# Patient Record
Sex: FEMALE | Race: WHITE
[De-identification: names, ages, dates, MRNs, and addresses within clinical notes are randomized per-mention and may not be internally consistent; named-entity substitution may affect disease eponyms.]

---

## 2017-04-29 ENCOUNTER — HOSPITAL ENCOUNTER (INPATIENT)
Dept: HOSPITAL 8 - ED | Age: 57
LOS: 3 days | Discharge: HOME | DRG: 417 | End: 2017-05-02
Attending: HOSPITALIST | Admitting: HOSPITALIST
Payer: COMMERCIAL

## 2017-04-29 VITALS — SYSTOLIC BLOOD PRESSURE: 122 MMHG | DIASTOLIC BLOOD PRESSURE: 75 MMHG

## 2017-04-29 VITALS — WEIGHT: 201.06 LBS | HEIGHT: 70 IN | BODY MASS INDEX: 28.78 KG/M2

## 2017-04-29 VITALS — SYSTOLIC BLOOD PRESSURE: 103 MMHG | DIASTOLIC BLOOD PRESSURE: 61 MMHG

## 2017-04-29 VITALS — DIASTOLIC BLOOD PRESSURE: 75 MMHG | SYSTOLIC BLOOD PRESSURE: 122 MMHG

## 2017-04-29 DIAGNOSIS — R73.9: ICD-10-CM

## 2017-04-29 DIAGNOSIS — K80.00: Primary | ICD-10-CM

## 2017-04-29 DIAGNOSIS — I95.9: ICD-10-CM

## 2017-04-29 DIAGNOSIS — N17.0: ICD-10-CM

## 2017-04-29 DIAGNOSIS — K72.00: ICD-10-CM

## 2017-04-29 DIAGNOSIS — R00.1: ICD-10-CM

## 2017-04-29 DIAGNOSIS — B17.9: ICD-10-CM

## 2017-04-29 LAB
AST SERPL-CCNC: 1709 U/L (ref 15–37)
BUN SERPL-MCNC: 20 MG/DL (ref 7–18)

## 2017-04-29 PROCEDURE — 86706 HEP B SURFACE ANTIBODY: CPT

## 2017-04-29 PROCEDURE — S0074 INJECTION, CEFOTETAN DISODIU: HCPCS

## 2017-04-29 PROCEDURE — 93306 TTE W/DOPPLER COMPLETE: CPT

## 2017-04-29 PROCEDURE — 85730 THROMBOPLASTIN TIME PARTIAL: CPT

## 2017-04-29 PROCEDURE — 36415 COLL VENOUS BLD VENIPUNCTURE: CPT

## 2017-04-29 PROCEDURE — 93005 ELECTROCARDIOGRAM TRACING: CPT

## 2017-04-29 PROCEDURE — 85651 RBC SED RATE NONAUTOMATED: CPT

## 2017-04-29 PROCEDURE — 74181 MRI ABDOMEN W/O CONTRAST: CPT

## 2017-04-29 PROCEDURE — 85025 COMPLETE CBC W/AUTO DIFF WBC: CPT

## 2017-04-29 PROCEDURE — 85610 PROTHROMBIN TIME: CPT

## 2017-04-29 PROCEDURE — 96361 HYDRATE IV INFUSION ADD-ON: CPT

## 2017-04-29 PROCEDURE — 80053 COMPREHEN METABOLIC PANEL: CPT

## 2017-04-29 PROCEDURE — 88304 TISSUE EXAM BY PATHOLOGIST: CPT

## 2017-04-29 PROCEDURE — 86704 HEP B CORE ANTIBODY TOTAL: CPT

## 2017-04-29 PROCEDURE — 84443 ASSAY THYROID STIM HORMONE: CPT

## 2017-04-29 PROCEDURE — 83690 ASSAY OF LIPASE: CPT

## 2017-04-29 PROCEDURE — 76700 US EXAM ABDOM COMPLETE: CPT

## 2017-04-29 PROCEDURE — 86708 HEPATITIS A ANTIBODY: CPT

## 2017-04-29 PROCEDURE — 96375 TX/PRO/DX INJ NEW DRUG ADDON: CPT

## 2017-04-29 PROCEDURE — S0028 INJECTION, FAMOTIDINE, 20 MG: HCPCS

## 2017-04-29 PROCEDURE — 87340 HEPATITIS B SURFACE AG IA: CPT

## 2017-04-29 PROCEDURE — 86803 HEPATITIS C AB TEST: CPT

## 2017-04-29 PROCEDURE — 96374 THER/PROPH/DIAG INJ IV PUSH: CPT

## 2017-04-29 RX ADMIN — OXYCODONE HYDROCHLORIDE AND ACETAMINOPHEN PRN TAB: 5; 325 TABLET ORAL at 17:25

## 2017-04-29 RX ADMIN — SODIUM CHLORIDE AND POTASSIUM CHLORIDE SCH MLS/HR: .9; .15 SOLUTION INTRAVENOUS at 15:26

## 2017-04-29 RX ADMIN — OXYCODONE HYDROCHLORIDE AND ACETAMINOPHEN PRN TAB: 5; 325 TABLET ORAL at 21:34

## 2017-04-29 RX ADMIN — SODIUM CHLORIDE AND POTASSIUM CHLORIDE SCH MLS/HR: .9; .15 SOLUTION INTRAVENOUS at 23:55

## 2017-04-30 VITALS — SYSTOLIC BLOOD PRESSURE: 90 MMHG | DIASTOLIC BLOOD PRESSURE: 52 MMHG

## 2017-04-30 VITALS — DIASTOLIC BLOOD PRESSURE: 56 MMHG | SYSTOLIC BLOOD PRESSURE: 102 MMHG

## 2017-04-30 VITALS — SYSTOLIC BLOOD PRESSURE: 97 MMHG | DIASTOLIC BLOOD PRESSURE: 63 MMHG

## 2017-04-30 VITALS — SYSTOLIC BLOOD PRESSURE: 96 MMHG | DIASTOLIC BLOOD PRESSURE: 61 MMHG

## 2017-04-30 VITALS — SYSTOLIC BLOOD PRESSURE: 106 MMHG | DIASTOLIC BLOOD PRESSURE: 58 MMHG

## 2017-04-30 VITALS — DIASTOLIC BLOOD PRESSURE: 53 MMHG | SYSTOLIC BLOOD PRESSURE: 94 MMHG

## 2017-04-30 VITALS — SYSTOLIC BLOOD PRESSURE: 94 MMHG | DIASTOLIC BLOOD PRESSURE: 50 MMHG

## 2017-04-30 LAB
AST SERPL-CCNC: 799 U/L (ref 15–37)
BUN SERPL-MCNC: 11 MG/DL (ref 7–18)

## 2017-04-30 PROCEDURE — 0FT44ZZ RESECTION OF GALLBLADDER, PERCUTANEOUS ENDOSCOPIC APPROACH: ICD-10-PCS | Performed by: SURGERY

## 2017-04-30 RX ADMIN — OXYCODONE HYDROCHLORIDE AND ACETAMINOPHEN PRN TAB: 5; 325 TABLET ORAL at 20:53

## 2017-04-30 RX ADMIN — SODIUM CHLORIDE AND POTASSIUM CHLORIDE SCH MLS/HR: .9; .15 SOLUTION INTRAVENOUS at 10:26

## 2017-04-30 RX ADMIN — SODIUM CHLORIDE AND POTASSIUM CHLORIDE SCH MLS/HR: .9; .15 SOLUTION INTRAVENOUS at 20:52

## 2017-05-01 VITALS — DIASTOLIC BLOOD PRESSURE: 61 MMHG | SYSTOLIC BLOOD PRESSURE: 110 MMHG

## 2017-05-01 VITALS — DIASTOLIC BLOOD PRESSURE: 55 MMHG | SYSTOLIC BLOOD PRESSURE: 93 MMHG

## 2017-05-01 VITALS — SYSTOLIC BLOOD PRESSURE: 101 MMHG | DIASTOLIC BLOOD PRESSURE: 64 MMHG

## 2017-05-01 VITALS — SYSTOLIC BLOOD PRESSURE: 102 MMHG | DIASTOLIC BLOOD PRESSURE: 65 MMHG

## 2017-05-01 VITALS — DIASTOLIC BLOOD PRESSURE: 50 MMHG | SYSTOLIC BLOOD PRESSURE: 86 MMHG

## 2017-05-01 VITALS — SYSTOLIC BLOOD PRESSURE: 84 MMHG | DIASTOLIC BLOOD PRESSURE: 48 MMHG

## 2017-05-01 LAB
AST SERPL-CCNC: 275 U/L (ref 15–37)
BUN SERPL-MCNC: 8 MG/DL (ref 7–18)

## 2017-05-01 RX ADMIN — OXYCODONE HYDROCHLORIDE AND ACETAMINOPHEN PRN TAB: 5; 325 TABLET ORAL at 05:08

## 2017-05-01 RX ADMIN — OXYCODONE HYDROCHLORIDE AND ACETAMINOPHEN PRN TAB: 5; 325 TABLET ORAL at 13:23

## 2017-05-01 RX ADMIN — OXYCODONE HYDROCHLORIDE AND ACETAMINOPHEN PRN TAB: 5; 325 TABLET ORAL at 01:20

## 2017-05-01 RX ADMIN — SODIUM CHLORIDE AND POTASSIUM CHLORIDE SCH MLS/HR: .9; .15 SOLUTION INTRAVENOUS at 21:46

## 2017-05-01 RX ADMIN — SODIUM CHLORIDE AND POTASSIUM CHLORIDE SCH MLS/HR: .9; .15 SOLUTION INTRAVENOUS at 13:43

## 2017-05-01 RX ADMIN — SODIUM CHLORIDE AND POTASSIUM CHLORIDE SCH MLS/HR: .9; .15 SOLUTION INTRAVENOUS at 05:40

## 2017-05-01 RX ADMIN — OXYCODONE HYDROCHLORIDE AND ACETAMINOPHEN PRN TAB: 5; 325 TABLET ORAL at 09:13

## 2017-05-01 RX ADMIN — OXYCODONE HYDROCHLORIDE AND ACETAMINOPHEN PRN TAB: 5; 325 TABLET ORAL at 20:47

## 2017-05-02 VITALS — DIASTOLIC BLOOD PRESSURE: 63 MMHG | SYSTOLIC BLOOD PRESSURE: 105 MMHG

## 2017-05-02 VITALS — SYSTOLIC BLOOD PRESSURE: 103 MMHG | DIASTOLIC BLOOD PRESSURE: 65 MMHG

## 2017-05-02 RX ADMIN — OXYCODONE HYDROCHLORIDE AND ACETAMINOPHEN PRN TAB: 5; 325 TABLET ORAL at 06:09

## 2022-12-25 ENCOUNTER — HOSPITAL ENCOUNTER (EMERGENCY)
Facility: MEDICAL CENTER | Age: 62
End: 2022-12-26
Attending: STUDENT IN AN ORGANIZED HEALTH CARE EDUCATION/TRAINING PROGRAM
Payer: COMMERCIAL

## 2022-12-25 ENCOUNTER — APPOINTMENT (OUTPATIENT)
Dept: RADIOLOGY | Facility: MEDICAL CENTER | Age: 62
End: 2022-12-25
Attending: STUDENT IN AN ORGANIZED HEALTH CARE EDUCATION/TRAINING PROGRAM

## 2022-12-25 VITALS
SYSTOLIC BLOOD PRESSURE: 142 MMHG | OXYGEN SATURATION: 97 % | TEMPERATURE: 97.6 F | BODY MASS INDEX: 29.4 KG/M2 | HEART RATE: 79 BPM | RESPIRATION RATE: 15 BRPM | WEIGHT: 210 LBS | DIASTOLIC BLOOD PRESSURE: 82 MMHG | HEIGHT: 71 IN

## 2022-12-25 DIAGNOSIS — S09.90XA CLOSED HEAD INJURY, INITIAL ENCOUNTER: ICD-10-CM

## 2022-12-25 DIAGNOSIS — S01.81XA FACIAL LACERATION, INITIAL ENCOUNTER: ICD-10-CM

## 2022-12-25 PROCEDURE — A9270 NON-COVERED ITEM OR SERVICE: HCPCS | Performed by: STUDENT IN AN ORGANIZED HEALTH CARE EDUCATION/TRAINING PROGRAM

## 2022-12-25 PROCEDURE — 70450 CT HEAD/BRAIN W/O DYE: CPT

## 2022-12-25 PROCEDURE — 700102 HCHG RX REV CODE 250 W/ 637 OVERRIDE(OP): Performed by: STUDENT IN AN ORGANIZED HEALTH CARE EDUCATION/TRAINING PROGRAM

## 2022-12-25 PROCEDURE — 90715 TDAP VACCINE 7 YRS/> IM: CPT | Performed by: STUDENT IN AN ORGANIZED HEALTH CARE EDUCATION/TRAINING PROGRAM

## 2022-12-25 PROCEDURE — 90471 IMMUNIZATION ADMIN: CPT

## 2022-12-25 PROCEDURE — 304999 HCHG REPAIR-SIMPLE/INTERMED LEVEL 1

## 2022-12-25 PROCEDURE — 700101 HCHG RX REV CODE 250: Performed by: STUDENT IN AN ORGANIZED HEALTH CARE EDUCATION/TRAINING PROGRAM

## 2022-12-25 PROCEDURE — 700105 HCHG RX REV CODE 258: Performed by: STUDENT IN AN ORGANIZED HEALTH CARE EDUCATION/TRAINING PROGRAM

## 2022-12-25 PROCEDURE — 99284 EMERGENCY DEPT VISIT MOD MDM: CPT

## 2022-12-25 PROCEDURE — 70486 CT MAXILLOFACIAL W/O DYE: CPT

## 2022-12-25 PROCEDURE — 700111 HCHG RX REV CODE 636 W/ 250 OVERRIDE (IP): Performed by: STUDENT IN AN ORGANIZED HEALTH CARE EDUCATION/TRAINING PROGRAM

## 2022-12-25 PROCEDURE — 303747 HCHG EXTRA SUTURE

## 2022-12-25 RX ORDER — SODIUM CHLORIDE 9 MG/ML
1000 INJECTION, SOLUTION INTRAVENOUS ONCE
Status: COMPLETED | OUTPATIENT
Start: 2022-12-25 | End: 2022-12-25

## 2022-12-25 RX ORDER — ACETAMINOPHEN 500 MG
1000 TABLET ORAL ONCE
Status: COMPLETED | OUTPATIENT
Start: 2022-12-25 | End: 2022-12-25

## 2022-12-25 RX ADMIN — ACETAMINOPHEN 1000 MG: 500 TABLET ORAL at 22:40

## 2022-12-25 RX ADMIN — LIDOCAINE HYDROCHLORIDE 10 ML: 10 INJECTION, SOLUTION INFILTRATION; PERINEURAL at 22:05

## 2022-12-25 RX ADMIN — SODIUM CHLORIDE 1000 ML: 9 INJECTION, SOLUTION INTRAVENOUS at 21:40

## 2022-12-25 RX ADMIN — CLOSTRIDIUM TETANI TOXOID ANTIGEN (FORMALDEHYDE INACTIVATED), CORYNEBACTERIUM DIPHTHERIAE TOXOID ANTIGEN (FORMALDEHYDE INACTIVATED), BORDETELLA PERTUSSIS TOXOID ANTIGEN (GLUTARALDEHYDE INACTIVATED), BORDETELLA PERTUSSIS FILAMENTOUS HEMAGGLUTININ ANTIGEN (FORMALDEHYDE INACTIVATED), BORDETELLA PERTUSSIS PERTACTIN ANTIGEN, AND BORDETELLA PERTUSSIS FIMBRIAE 2/3 ANTIGEN 0.5 ML: 5; 2; 2.5; 5; 3; 5 INJECTION, SUSPENSION INTRAMUSCULAR at 23:57

## 2022-12-25 ASSESSMENT — ENCOUNTER SYMPTOMS
LOSS OF CONSCIOUSNESS: 0
FEVER: 0
HEADACHES: 1
DOUBLE VISION: 0
FALLS: 1
COUGH: 0
CHILLS: 0
NECK PAIN: 0
SHORTNESS OF BREATH: 0
BLURRED VISION: 0
VOMITING: 0
NAUSEA: 0
ABDOMINAL PAIN: 0
SORE THROAT: 0

## 2022-12-26 NOTE — ED TRIAGE NOTES
"Chief Complaint   Patient presents with    T-5000 FALL     -Thinners -LOC +Head Strike     Patient BIB family in  after a [facility]. Patient was walking up the stairs carrying gifts and things when she tripped on a stair and fell forward. Patient has an approx. 1 inch laceration to her right upper forehead and swelling and a hematoma to her right eyebrow/forehead. Patient caught herself with her left hand and right knee on landing.    Denies LOC and thinners.    Patient given an ice pack.    Pt is alert and oriented, speaking in full sentences, follows commands and responds appropriately to questions. Resp are even and unlabored.      Pt placed in lobby. Pt educated on triage process. Pt encouraged to alert staff for any changes.     Patient and staff wearing appropriate PPE.    BP (!) 160/99   Pulse 82   Temp 36.2 °C (97.2 °F) (Temporal)   Resp 16   Ht 1.791 m (5' 10.5\")   Wt 95.3 kg (210 lb)   SpO2 95%    "

## 2022-12-26 NOTE — ED PROVIDER NOTES
"ED Provider Note    Chief Complaint:   Fall    HPI:  Angeles Mcpherson is a very pleasant 62-year-old female who presents with a ground-level fall, mechanical in nature, tripping up the stairs while carrying gifts.  Patient reports that she fell headfirst into concrete.  Patient has a laceration on her right upper brow.  Patient reports some mild trauma to the left knee but minimal pain.  Patient denies loss of consciousness or blood thinners.  Patient does endorse drinking alcohol prior to this.  Patient denies numbness or weakness, vision changes.    Review of Systems:  Review of Systems   Constitutional:  Negative for chills and fever.   HENT:  Negative for congestion and sore throat.    Eyes:  Negative for blurred vision and double vision.   Respiratory:  Negative for cough and shortness of breath.    Cardiovascular:  Negative for chest pain and leg swelling.   Gastrointestinal:  Negative for abdominal pain, nausea and vomiting.   Genitourinary:  Negative for dysuria and hematuria.   Musculoskeletal:  Positive for falls. Negative for neck pain.   Skin:  Negative for itching and rash.   Neurological:  Positive for headaches. Negative for loss of consciousness.     Family History:  History reviewed. No pertinent family history.    Past Medical History:       Social History:  Social History     Tobacco Use    Smoking status: Never    Smokeless tobacco: Never   Vaping Use    Vaping Use: Never used   Substance and Sexual Activity    Alcohol use: Not Currently    Drug use: Never    Sexual activity: Not on file       Surgical History:  patient denies any surgical history    Allergies:  Not on File    Physical Exam:  Vital Signs: BP (!) 142/82   Pulse 79   Temp 36.4 °C (97.6 °F)   Resp 15   Ht 1.791 m (5' 10.5\")   Wt 95.3 kg (210 lb)   SpO2 97%   BMI 29.71 kg/m²   Physical Exam  Vitals and nursing note reviewed.   Constitutional:       Comments: Patient is lying in bed supine, pleasant, conversant, speaking in " complete sentences   HENT:      Head: Normocephalic and atraumatic.        Comments: Full-thickness laceration to the right forehead, 4 cm  Eyes:      Extraocular Movements: Extraocular movements intact.      Conjunctiva/sclera: Conjunctivae normal.      Pupils: Pupils are equal, round, and reactive to light.   Cardiovascular:      Pulses: Normal pulses.      Comments: HR 82  Pulmonary:      Effort: Pulmonary effort is normal. No respiratory distress.   Musculoskeletal:         General: No swelling. Normal range of motion.      Cervical back: Normal range of motion. No rigidity or tenderness.   Skin:     General: Skin is warm and dry.      Capillary Refill: Capillary refill takes less than 2 seconds.   Neurological:      Mental Status: She is alert.       Medical records reviewed for continuity of care.     No results found for this or any previous visit.    Radiology:  CT-MAXILLOFACIAL W/O PLUS RECONS   Final Result      1.  Negative for facial or orbital fracture      2.  Right frontal scalp laceration      3.  osteoarthritis of both temporomandibular joint      4.  Additional chronic anatomic abnormalities described in the findings section      CT-HEAD W/O   Final Result      1.  No acute intracranial abnormality      2.  Right frontal scalp laceration              MDM:  CT head to rule out acute intracranial normality, skull fracture.  CT max face to rule out facial fracture.  Extraocular movements intact, entrapment inconsistent with patient presentation at this time.  Tdap fully updated.  Lidocaine for analgesia followed by suture repair.  Disposition pending CT imaging and laceration repair.    Electronically signed by: Ahmet Olguin M.D., 12/25/2022, 8:53 PM    CT brain, CT max face unremarkable for acute intracranial abnormality, osseous injury or other acute process.  Tdap has been updated.  Patient anesthetized with lidocaine and suture repair has been conducted without complication.  Patient  counseled return to a healthcare facility in 7 days for suture removal and return for evidence of infection.    Repeat physical exam benign.  I doubt any serious emergency process at this time.  Patient and/or family, friends given strict return precautions and care instructions. They have demonstrated understanding of discharge instructions through teach back mechanism. Advised PCP follow-up in 1-2 days.  Patient/family/friend expresses understanding and agrees to plan.    This dictation has been created using voice recognition software. I am continuously working with the software to minimize the number of voice recognition errors and I have made every attempt to manually correct the errors within my dictation. However errors  related to this voice recognition software may still exist and should be interpreted within the appropriate context.     Electronically signed by: Ahmet Olguin M.D., 12/26/2022 12:30 AM    LACERATION REPAIR PROCEDURE NOTE  The patient's identification was confirmed and consent was obtained.  This procedure was performed by Dr. Olguin at 2330.  Site: Right forehead  Sterile procedures observed  Anesthetic used (type and amt): 15 cc 1% lidocaine without epinephrine  Suture type/size: 3-0 Ethilon  Length: 5 cm  # of Sutures: 5  Technique: Simple interrupted  Complexity: Simple  Tetanus ordered  Site anesthetized, irrigated with NS, explored without evidence of foreign body, wound well approximated, site covered with dry, sterile dressing. Patient tolerated procedure well without complications. Instructions for care discussed verbally and patient provided with additional written instructions for homecare and f/u.      Disposition:  Home    Final Impression:  1. Closed head injury, initial encounter    2. Facial laceration, initial encounter        Electronically signed by: Ahmet Olguin M.D., 12/26/2022 12:31 AM

## 2022-12-26 NOTE — ED NOTES
Pt medicated per MAR. Discharge teaching with paperwork  provided to pt. Pt verbalized understanding of teaching and all questions answered. Pt is A&Ox4 with stable vital signs and  stable physical assessment upon discharge. Pt ambulatory out of ED with steady gait with all personal belongings.

## 2023-05-16 ENCOUNTER — OFFICE VISIT (OUTPATIENT)
Dept: MEDICAL GROUP | Facility: PHYSICIAN GROUP | Age: 63
End: 2023-05-16
Payer: COMMERCIAL

## 2023-05-16 VITALS
OXYGEN SATURATION: 94 % | BODY MASS INDEX: 34.93 KG/M2 | WEIGHT: 244 LBS | SYSTOLIC BLOOD PRESSURE: 90 MMHG | DIASTOLIC BLOOD PRESSURE: 60 MMHG | TEMPERATURE: 97.7 F | HEART RATE: 51 BPM | HEIGHT: 70 IN

## 2023-05-16 DIAGNOSIS — E03.8 SUBCLINICAL HYPOTHYROIDISM: ICD-10-CM

## 2023-05-16 DIAGNOSIS — Z96.641 STATUS POST RIGHT HIP REPLACEMENT: ICD-10-CM

## 2023-05-16 DIAGNOSIS — M16.11 PRIMARY OSTEOARTHRITIS OF RIGHT HIP: ICD-10-CM

## 2023-05-16 DIAGNOSIS — Z12.11 SCREENING FOR COLORECTAL CANCER: ICD-10-CM

## 2023-05-16 DIAGNOSIS — Z12.83 SKIN CANCER SCREENING: ICD-10-CM

## 2023-05-16 DIAGNOSIS — Z12.12 SCREENING FOR COLORECTAL CANCER: ICD-10-CM

## 2023-05-16 DIAGNOSIS — Z11.59 NEED FOR HEPATITIS C SCREENING TEST: ICD-10-CM

## 2023-05-16 DIAGNOSIS — Z12.31 ENCOUNTER FOR SCREENING MAMMOGRAM FOR BREAST CANCER: ICD-10-CM

## 2023-05-16 DIAGNOSIS — E66.9 CLASS 2 OBESITY WITHOUT SERIOUS COMORBIDITY WITH BODY MASS INDEX (BMI) OF 35.0 TO 35.9 IN ADULT, UNSPECIFIED OBESITY TYPE: ICD-10-CM

## 2023-05-16 PROBLEM — E66.812 CLASS 2 OBESITY WITHOUT SERIOUS COMORBIDITY WITH BODY MASS INDEX (BMI) OF 35.0 TO 35.9 IN ADULT: Status: ACTIVE | Noted: 2023-05-16

## 2023-05-16 PROCEDURE — 3078F DIAST BP <80 MM HG: CPT

## 2023-05-16 PROCEDURE — 99204 OFFICE O/P NEW MOD 45 MIN: CPT

## 2023-05-16 PROCEDURE — 3074F SYST BP LT 130 MM HG: CPT

## 2023-05-16 SDOH — ECONOMIC STABILITY: INCOME INSECURITY: HOW HARD IS IT FOR YOU TO PAY FOR THE VERY BASICS LIKE FOOD, HOUSING, MEDICAL CARE, AND HEATING?: NOT VERY HARD

## 2023-05-16 SDOH — ECONOMIC STABILITY: INCOME INSECURITY: IN THE LAST 12 MONTHS, WAS THERE A TIME WHEN YOU WERE NOT ABLE TO PAY THE MORTGAGE OR RENT ON TIME?: NO

## 2023-05-16 SDOH — HEALTH STABILITY: PHYSICAL HEALTH: ON AVERAGE, HOW MANY MINUTES DO YOU ENGAGE IN EXERCISE AT THIS LEVEL?: 40 MIN

## 2023-05-16 SDOH — ECONOMIC STABILITY: HOUSING INSECURITY
IN THE LAST 12 MONTHS, WAS THERE A TIME WHEN YOU DID NOT HAVE A STEADY PLACE TO SLEEP OR SLEPT IN A SHELTER (INCLUDING NOW)?: NO

## 2023-05-16 SDOH — ECONOMIC STABILITY: TRANSPORTATION INSECURITY
IN THE PAST 12 MONTHS, HAS LACK OF TRANSPORTATION KEPT YOU FROM MEETINGS, WORK, OR FROM GETTING THINGS NEEDED FOR DAILY LIVING?: NO

## 2023-05-16 SDOH — HEALTH STABILITY: PHYSICAL HEALTH: ON AVERAGE, HOW MANY DAYS PER WEEK DO YOU ENGAGE IN MODERATE TO STRENUOUS EXERCISE (LIKE A BRISK WALK)?: 3 DAYS

## 2023-05-16 SDOH — ECONOMIC STABILITY: TRANSPORTATION INSECURITY
IN THE PAST 12 MONTHS, HAS LACK OF RELIABLE TRANSPORTATION KEPT YOU FROM MEDICAL APPOINTMENTS, MEETINGS, WORK OR FROM GETTING THINGS NEEDED FOR DAILY LIVING?: NO

## 2023-05-16 SDOH — ECONOMIC STABILITY: FOOD INSECURITY: WITHIN THE PAST 12 MONTHS, YOU WORRIED THAT YOUR FOOD WOULD RUN OUT BEFORE YOU GOT MONEY TO BUY MORE.: NEVER TRUE

## 2023-05-16 SDOH — ECONOMIC STABILITY: TRANSPORTATION INSECURITY
IN THE PAST 12 MONTHS, HAS THE LACK OF TRANSPORTATION KEPT YOU FROM MEDICAL APPOINTMENTS OR FROM GETTING MEDICATIONS?: NO

## 2023-05-16 SDOH — ECONOMIC STABILITY: FOOD INSECURITY: WITHIN THE PAST 12 MONTHS, THE FOOD YOU BOUGHT JUST DIDN'T LAST AND YOU DIDN'T HAVE MONEY TO GET MORE.: NEVER TRUE

## 2023-05-16 SDOH — HEALTH STABILITY: MENTAL HEALTH
STRESS IS WHEN SOMEONE FEELS TENSE, NERVOUS, ANXIOUS, OR CAN'T SLEEP AT NIGHT BECAUSE THEIR MIND IS TROUBLED. HOW STRESSED ARE YOU?: TO SOME EXTENT

## 2023-05-16 SDOH — ECONOMIC STABILITY: HOUSING INSECURITY: IN THE LAST 12 MONTHS, HOW MANY PLACES HAVE YOU LIVED?: 1

## 2023-05-16 ASSESSMENT — LIFESTYLE VARIABLES
SKIP TO QUESTIONS 9-10: 1
HOW OFTEN DO YOU HAVE SIX OR MORE DRINKS ON ONE OCCASION: NEVER
HOW MANY STANDARD DRINKS CONTAINING ALCOHOL DO YOU HAVE ON A TYPICAL DAY: 1 OR 2
AUDIT-C TOTAL SCORE: 1
HOW OFTEN DO YOU HAVE A DRINK CONTAINING ALCOHOL: MONTHLY OR LESS

## 2023-05-16 ASSESSMENT — ENCOUNTER SYMPTOMS
DIARRHEA: 0
COUGH: 0
FEVER: 0
WEIGHT LOSS: 0
WEAKNESS: 0
ABDOMINAL PAIN: 0
CHILLS: 0
DIZZINESS: 0
SHORTNESS OF BREATH: 0
HEADACHES: 0
NAUSEA: 0
CONSTIPATION: 0
MYALGIAS: 0
VOMITING: 0
BLURRED VISION: 0

## 2023-05-16 ASSESSMENT — PATIENT HEALTH QUESTIONNAIRE - PHQ9: CLINICAL INTERPRETATION OF PHQ2 SCORE: 0

## 2023-05-16 ASSESSMENT — SOCIAL DETERMINANTS OF HEALTH (SDOH)
HOW OFTEN DO YOU ATTENT MEETINGS OF THE CLUB OR ORGANIZATION YOU BELONG TO?: MORE THAN 4 TIMES PER YEAR
HOW OFTEN DO YOU ATTEND CHURCH OR RELIGIOUS SERVICES?: NEVER
DO YOU BELONG TO ANY CLUBS OR ORGANIZATIONS SUCH AS CHURCH GROUPS UNIONS, FRATERNAL OR ATHLETIC GROUPS, OR SCHOOL GROUPS?: YES
HOW OFTEN DO YOU ATTEND CHURCH OR RELIGIOUS SERVICES?: NEVER
HOW OFTEN DO YOU HAVE A DRINK CONTAINING ALCOHOL: MONTHLY OR LESS
HOW OFTEN DO YOU HAVE SIX OR MORE DRINKS ON ONE OCCASION: NEVER
HOW MANY DRINKS CONTAINING ALCOHOL DO YOU HAVE ON A TYPICAL DAY WHEN YOU ARE DRINKING: 1 OR 2
WITHIN THE PAST 12 MONTHS, YOU WORRIED THAT YOUR FOOD WOULD RUN OUT BEFORE YOU GOT THE MONEY TO BUY MORE: NEVER TRUE
DO YOU BELONG TO ANY CLUBS OR ORGANIZATIONS SUCH AS CHURCH GROUPS UNIONS, FRATERNAL OR ATHLETIC GROUPS, OR SCHOOL GROUPS?: YES
IN A TYPICAL WEEK, HOW MANY TIMES DO YOU TALK ON THE PHONE WITH FAMILY, FRIENDS, OR NEIGHBORS?: MORE THAN THREE TIMES A WEEK
HOW OFTEN DO YOU ATTENT MEETINGS OF THE CLUB OR ORGANIZATION YOU BELONG TO?: MORE THAN 4 TIMES PER YEAR
HOW OFTEN DO YOU GET TOGETHER WITH FRIENDS OR RELATIVES?: MORE THAN THREE TIMES A WEEK
HOW HARD IS IT FOR YOU TO PAY FOR THE VERY BASICS LIKE FOOD, HOUSING, MEDICAL CARE, AND HEATING?: NOT VERY HARD
IN A TYPICAL WEEK, HOW MANY TIMES DO YOU TALK ON THE PHONE WITH FAMILY, FRIENDS, OR NEIGHBORS?: MORE THAN THREE TIMES A WEEK
HOW OFTEN DO YOU GET TOGETHER WITH FRIENDS OR RELATIVES?: MORE THAN THREE TIMES A WEEK

## 2023-05-16 NOTE — PROGRESS NOTES
Subjective:     CC:  Diagnoses of Class 2 obesity without serious comorbidity with body mass index (BMI) of 35.0 to 35.9 in adult, unspecified obesity type, Subclinical hypothyroidism, Primary osteoarthritis of right hip, Need for hepatitis C screening test, Screening for colorectal cancer, Encounter for screening mammogram for breast cancer, Skin cancer screening, and Status post right hip replacement were pertinent to this visit.    HISTORY OF THE PRESENT ILLNESS: Patient is a 63 y.o. female. This pleasant patient is here today to establish care and discuss the following problems:    Problem   Class 2 Obesity Without Serious Comorbidity With Body Mass Index (Bmi) of 35.0 to 35.9 in Adult    Chronic condition with weight today in clinic at 244 pounds and BMI at 35.01.  Patient is actively working with functional medicine for weight loss and has a list of labs she would like ordered per functional medicine doctor.  She is working on actively eating much healthier.  Consuming anywhere from 1200 to 1500 jacob/day       Status Post Right Hip Replacement    Right hip replacement with Dr. Jimenez 2018       Osteoarthritis of Right Hip    Chronic condition for which patient has had total right hip replacement.  She is seen at [facility].  She is doing well this has been 7 years since original hip replacement.  Denies hip pain.       Subclinical Hypothyroidism    -Per previous documentation it appears patient had elevated anti-TPO at 117.  She denies any history of hypothyroidism however, has had difficulty with waking and extremely dry skin.  Denies diarrhea or constipation.             Health Maintenance: Completed    ROS:   Review of Systems   Constitutional:  Negative for chills, fever, malaise/fatigue and weight loss.   Eyes:  Negative for blurred vision.   Respiratory:  Negative for cough and shortness of breath.    Cardiovascular:  Negative for chest pain.   Gastrointestinal:  Negative for abdominal pain, constipation,  "diarrhea, nausea and vomiting.   Musculoskeletal:  Negative for myalgias.   Neurological:  Negative for dizziness, weakness and headaches.         Objective:     Exam: BP 90/60 (BP Location: Left arm, Patient Position: Sitting, BP Cuff Size: Large adult)   Pulse (!) 51   Temp 36.5 °C (97.7 °F) (Temporal)   Ht 1.778 m (5' 10\")   Wt 111 kg (244 lb)   SpO2 94%  Body mass index is 35.01 kg/m².    Physical Exam  Constitutional:       General: She is not in acute distress.     Appearance: Normal appearance. She is not ill-appearing or toxic-appearing.   HENT:      Head: Normocephalic.   Eyes:      Conjunctiva/sclera: Conjunctivae normal.   Pulmonary:      Effort: Pulmonary effort is normal.   Skin:     General: Skin is warm and dry.   Neurological:      General: No focal deficit present.      Mental Status: She is alert and oriented to person, place, and time.   Psychiatric:         Mood and Affect: Mood normal.         Behavior: Behavior normal.           Labs: No recent labs    Assessment & Plan: Medical Decision Making   63 y.o. female with the following -    Problem List Items Addressed This Visit       Osteoarthritis of right hip     Chronic condition stable  - Continue to follow with Dr. Jimenez if needed for this condition           Relevant Orders    CBC WITH DIFFERENTIAL    Comp Metabolic Panel    Lipid Profile    T3 FREE    FREE THYROXINE    TSH    MAGNESIUM    PHOSPHORUS    IRON/TOTAL IRON BIND    FERRITIN    T UPTAKE    TRIIDOTHYRONINE    THYROID PEROXIDASE  (TPO) AB    ANTITHYROGLOBULIN AB    INSULIN FASTING    HEMOGLOBIN A1C    VITAMIN D,25 HYDROXY (DEFICIENCY)    CRP QUANTITIVE (NON-CARDIAC)    HOMOCYSTEINE    LDH    GAMMA GT (GGT)    FIBRINOGEN    URINALYSIS,CULTURE IF INDICATED    TRANSFERRIN    TRANSFERRIN SATURATION    FOLATE    VIT B12,  FOLIC ACID    METHYLMALONIC ACID URINE RANDOM    LEPTIN SERUM    Subclinical hypothyroidism     Chronic condition appears to be stable  - We will recheck labs as " well as thyroid studies as requested           Relevant Orders    CBC WITH DIFFERENTIAL    Comp Metabolic Panel    Lipid Profile    T3 FREE    FREE THYROXINE    TSH    MAGNESIUM    PHOSPHORUS    IRON/TOTAL IRON BIND    FERRITIN    T UPTAKE    TRIIDOTHYRONINE    THYROID PEROXIDASE  (TPO) AB    ANTITHYROGLOBULIN AB    INSULIN FASTING    HEMOGLOBIN A1C    VITAMIN D,25 HYDROXY (DEFICIENCY)    CRP QUANTITIVE (NON-CARDIAC)    HOMOCYSTEINE    LDH    GAMMA GT (GGT)    FIBRINOGEN    URINALYSIS,CULTURE IF INDICATED    TRANSFERRIN    TRANSFERRIN SATURATION    FOLATE    VIT B12,  FOLIC ACID    METHYLMALONIC ACID URINE RANDOM    LEPTIN SERUM    Class 2 obesity without serious comorbidity with body mass index (BMI) of 35.0 to 35.9 in adult     Chronic condition uncontrolled  --Exercise: At least 150 minutes of moderate aerobic activity per week or 75 minutes of vigorous aerobic activity per week, +2 days/week of strength training  - Healthy lifestyle and eating habits: Mediterranean-based diet (rich in fruits, vegetables, nuts and healthy oils), proper hydration and avoiding sugary beverages, adequate sleep hygiene-(allowing 7 to 8 hours of overnight sleep).  Labs as follows:         Relevant Orders    CBC WITH DIFFERENTIAL    Comp Metabolic Panel    Lipid Profile    T3 FREE    FREE THYROXINE    TSH    MAGNESIUM    PHOSPHORUS    IRON/TOTAL IRON BIND    FERRITIN    T UPTAKE    TRIIDOTHYRONINE    THYROID PEROXIDASE  (TPO) AB    ANTITHYROGLOBULIN AB    INSULIN FASTING    HEMOGLOBIN A1C    VITAMIN D,25 HYDROXY (DEFICIENCY)    CRP QUANTITIVE (NON-CARDIAC)    HOMOCYSTEINE    LDH    GAMMA GT (GGT)    FIBRINOGEN    URINALYSIS,CULTURE IF INDICATED    TRANSFERRIN    TRANSFERRIN SATURATION    FOLATE    VIT B12,  FOLIC ACID    METHYLMALONIC ACID URINE RANDOM    LEPTIN SERUM    Status post right hip replacement     Other Visit Diagnoses       Need for hepatitis C screening test        Relevant Orders    HEP C VIRUS ANTIBODY    Screening for  colorectal cancer        Relevant Orders    COLOGUARD (FIT DNA)    Encounter for screening mammogram for breast cancer        Relevant Orders    MA-SCREENING MAMMO BILAT W/TOMOSYNTHESIS W/CAD    Skin cancer screening        Relevant Orders    Referral to Dermatology            Differential diagnosis, natural history, supportive care, and indications for immediate follow-up discussed.  Shared decision making approach was utilized, and patient is amendable with plan of care.  Patient understands to return to clinic or go to the emergency department if symptoms worsen. All questions and concerns addressed to the best of my knowledge.      Return in about 3 months (around 8/16/2023) for Wellness with Pap.    Please note that this dictation was created using voice recognition software. I have made every reasonable attempt to correct obvious errors, but I expect that there are errors of grammar and possibly content that I did not discover before finalizing the note.

## 2023-05-16 NOTE — ASSESSMENT & PLAN NOTE
Chronic condition uncontrolled  --Exercise: At least 150 minutes of moderate aerobic activity per week or 75 minutes of vigorous aerobic activity per week, +2 days/week of strength training  - Healthy lifestyle and eating habits: Mediterranean-based diet (rich in fruits, vegetables, nuts and healthy oils), proper hydration and avoiding sugary beverages, adequate sleep hygiene-(allowing 7 to 8 hours of overnight sleep).  Labs as follows:

## 2023-05-16 NOTE — ASSESSMENT & PLAN NOTE
Chronic condition appears to be stable  - We will recheck labs as well as thyroid studies as requested

## 2023-05-17 ENCOUNTER — HOSPITAL ENCOUNTER (OUTPATIENT)
Dept: RADIOLOGY | Facility: MEDICAL CENTER | Age: 63
End: 2023-05-17
Payer: COMMERCIAL

## 2023-05-17 DIAGNOSIS — Z12.31 ENCOUNTER FOR SCREENING MAMMOGRAM FOR BREAST CANCER: ICD-10-CM

## 2023-05-17 PROCEDURE — 77063 BREAST TOMOSYNTHESIS BI: CPT

## 2023-05-18 ENCOUNTER — HOSPITAL ENCOUNTER (OUTPATIENT)
Dept: RADIOLOGY | Facility: MEDICAL CENTER | Age: 63
End: 2023-05-18
Payer: COMMERCIAL

## 2023-06-28 DIAGNOSIS — Z13.6 SCREENING FOR CARDIOVASCULAR CONDITION: ICD-10-CM

## 2023-06-28 DIAGNOSIS — Z78.0 POST-MENOPAUSAL: ICD-10-CM

## 2023-06-29 ENCOUNTER — HOSPITAL ENCOUNTER (OUTPATIENT)
Dept: RADIOLOGY | Facility: MEDICAL CENTER | Age: 63
End: 2023-06-29
Payer: COMMERCIAL

## 2023-06-29 DIAGNOSIS — Z78.0 POST-MENOPAUSAL: ICD-10-CM

## 2023-06-29 PROCEDURE — 77080 DXA BONE DENSITY AXIAL: CPT

## 2023-07-06 ENCOUNTER — HOSPITAL ENCOUNTER (OUTPATIENT)
Dept: LAB | Facility: MEDICAL CENTER | Age: 63
End: 2023-07-06
Payer: COMMERCIAL

## 2023-07-06 DIAGNOSIS — Z13.6 SCREENING FOR CARDIOVASCULAR CONDITION: ICD-10-CM

## 2023-07-06 DIAGNOSIS — E66.9 CLASS 2 OBESITY WITHOUT SERIOUS COMORBIDITY WITH BODY MASS INDEX (BMI) OF 35.0 TO 35.9 IN ADULT, UNSPECIFIED OBESITY TYPE: ICD-10-CM

## 2023-07-06 DIAGNOSIS — Z11.59 NEED FOR HEPATITIS C SCREENING TEST: ICD-10-CM

## 2023-07-06 DIAGNOSIS — E03.8 SUBCLINICAL HYPOTHYROIDISM: ICD-10-CM

## 2023-07-06 DIAGNOSIS — M16.11 PRIMARY OSTEOARTHRITIS OF RIGHT HIP: ICD-10-CM

## 2023-07-06 LAB
25(OH)D3 SERPL-MCNC: 20 NG/ML (ref 30–100)
APPEARANCE UR: CLEAR
BACTERIA #/AREA URNS HPF: NEGATIVE /HPF
BASOPHILS # BLD AUTO: 0.8 % (ref 0–1.8)
BASOPHILS # BLD: 0.04 K/UL (ref 0–0.12)
BILIRUB UR QL STRIP.AUTO: NEGATIVE
COLOR UR: YELLOW
CRP SERPL HS-MCNC: <0.3 MG/DL (ref 0–0.75)
EOSINOPHIL # BLD AUTO: 0.2 K/UL (ref 0–0.51)
EOSINOPHIL NFR BLD: 4.1 % (ref 0–6.9)
EPI CELLS #/AREA URNS HPF: NORMAL /HPF
ERYTHROCYTE [DISTWIDTH] IN BLOOD BY AUTOMATED COUNT: 42.6 FL (ref 35.9–50)
EST. AVERAGE GLUCOSE BLD GHB EST-MCNC: 105 MG/DL
FERRITIN SERPL-MCNC: 86.4 NG/ML (ref 10–291)
FIBRINOGEN PPP-MCNC: 361 MG/DL (ref 215–460)
FOLATE SERPL-MCNC: 14.2 NG/ML
GGT SERPL-CCNC: 9 U/L (ref 7–34)
GLUCOSE UR STRIP.AUTO-MCNC: NEGATIVE MG/DL
HBA1C MFR BLD: 5.3 % (ref 4–5.6)
HCT VFR BLD AUTO: 43.4 % (ref 37–47)
HCV AB SER QL: NORMAL
HCYS SERPL-SCNC: 9.88 UMOL/L
HGB BLD-MCNC: 14.7 G/DL (ref 12–16)
HYALINE CASTS #/AREA URNS LPF: NORMAL /LPF
IMM GRANULOCYTES # BLD AUTO: 0.01 K/UL (ref 0–0.11)
IMM GRANULOCYTES NFR BLD AUTO: 0.2 % (ref 0–0.9)
IRON SATN MFR SERPL: 20 % (ref 15–55)
IRON SERPL-MCNC: 59 UG/DL (ref 40–170)
KETONES UR STRIP.AUTO-MCNC: NEGATIVE MG/DL
LDH SERPL L TO P-CCNC: 183 U/L (ref 107–266)
LEUKOCYTE ESTERASE UR QL STRIP.AUTO: ABNORMAL
LYMPHOCYTES # BLD AUTO: 1.63 K/UL (ref 1–4.8)
LYMPHOCYTES NFR BLD: 33.3 % (ref 22–41)
MAGNESIUM SERPL-MCNC: 2.1 MG/DL (ref 1.5–2.5)
MCH RBC QN AUTO: 31.5 PG (ref 27–33)
MCHC RBC AUTO-ENTMCNC: 33.9 G/DL (ref 32.2–35.5)
MCV RBC AUTO: 92.9 FL (ref 81.4–97.8)
MICRO URNS: ABNORMAL
MONOCYTES # BLD AUTO: 0.42 K/UL (ref 0–0.85)
MONOCYTES NFR BLD AUTO: 8.6 % (ref 0–13.4)
NEUTROPHILS # BLD AUTO: 2.6 K/UL (ref 1.82–7.42)
NEUTROPHILS NFR BLD: 53 % (ref 44–72)
NITRITE UR QL STRIP.AUTO: NEGATIVE
NRBC # BLD AUTO: 0 K/UL
NRBC BLD-RTO: 0 /100 WBC (ref 0–0.2)
PH UR STRIP.AUTO: 7 [PH] (ref 5–8)
PHOSPHATE SERPL-MCNC: 3.6 MG/DL (ref 2.5–4.5)
PLATELET # BLD AUTO: 256 K/UL (ref 164–446)
PMV BLD AUTO: 10.4 FL (ref 9–12.9)
PROT UR QL STRIP: NEGATIVE MG/DL
RBC # BLD AUTO: 4.67 M/UL (ref 4.2–5.4)
RBC # URNS HPF: NORMAL /HPF
RBC UR QL AUTO: NEGATIVE
SP GR UR STRIP.AUTO: 1.03
T3 SERPL-MCNC: 108 NG/DL (ref 60–181)
T3FREE SERPL-MCNC: 2.85 PG/ML (ref 2–4.4)
T4 FREE SERPL-MCNC: 1.16 NG/DL (ref 0.93–1.7)
THYROPEROXIDASE AB SERPL-ACNC: 247 IU/ML (ref 0–9)
TIBC SERPL-MCNC: 297 UG/DL (ref 250–450)
TRANSFERRIN SERPL-MCNC: 236 MG/DL (ref 200–370)
TSH SERPL DL<=0.005 MIU/L-ACNC: 1.96 UIU/ML (ref 0.38–5.33)
UIBC SERPL-MCNC: 238 UG/DL (ref 110–370)
UROBILINOGEN UR STRIP.AUTO-MCNC: 0.2 MG/DL
VIT B12 SERPL-MCNC: 216 PG/ML (ref 211–911)
WBC # BLD AUTO: 4.9 K/UL (ref 4.8–10.8)
WBC #/AREA URNS HPF: NORMAL /HPF

## 2023-07-06 PROCEDURE — 83036 HEMOGLOBIN GLYCOSYLATED A1C: CPT

## 2023-07-06 PROCEDURE — 86803 HEPATITIS C AB TEST: CPT

## 2023-07-06 PROCEDURE — 83735 ASSAY OF MAGNESIUM: CPT

## 2023-07-06 PROCEDURE — 84100 ASSAY OF PHOSPHORUS: CPT

## 2023-07-06 PROCEDURE — 84481 FREE ASSAY (FT-3): CPT

## 2023-07-06 PROCEDURE — 84443 ASSAY THYROID STIM HORMONE: CPT

## 2023-07-06 PROCEDURE — 83921 ORGANIC ACID SINGLE QUANT: CPT

## 2023-07-06 PROCEDURE — 84479 ASSAY OF THYROID (T3 OR T4): CPT

## 2023-07-06 PROCEDURE — 84439 ASSAY OF FREE THYROXINE: CPT

## 2023-07-06 PROCEDURE — 83090 ASSAY OF HOMOCYSTEINE: CPT

## 2023-07-06 PROCEDURE — 36415 COLL VENOUS BLD VENIPUNCTURE: CPT

## 2023-07-06 PROCEDURE — 82607 VITAMIN B-12: CPT

## 2023-07-06 PROCEDURE — 86376 MICROSOMAL ANTIBODY EACH: CPT

## 2023-07-06 PROCEDURE — 84466 ASSAY OF TRANSFERRIN: CPT

## 2023-07-06 PROCEDURE — 81001 URINALYSIS AUTO W/SCOPE: CPT

## 2023-07-06 PROCEDURE — 83615 LACTATE (LD) (LDH) ENZYME: CPT

## 2023-07-06 PROCEDURE — 83550 IRON BINDING TEST: CPT

## 2023-07-06 PROCEDURE — 86140 C-REACTIVE PROTEIN: CPT

## 2023-07-06 PROCEDURE — 82306 VITAMIN D 25 HYDROXY: CPT

## 2023-07-06 PROCEDURE — 85025 COMPLETE CBC W/AUTO DIFF WBC: CPT

## 2023-07-06 PROCEDURE — 86800 THYROGLOBULIN ANTIBODY: CPT

## 2023-07-06 PROCEDURE — 82977 ASSAY OF GGT: CPT

## 2023-07-06 PROCEDURE — 85384 FIBRINOGEN ACTIVITY: CPT

## 2023-07-06 PROCEDURE — 83520 IMMUNOASSAY QUANT NOS NONAB: CPT

## 2023-07-06 PROCEDURE — 84480 ASSAY TRIIODOTHYRONINE (T3): CPT

## 2023-07-06 PROCEDURE — 82746 ASSAY OF FOLIC ACID SERUM: CPT

## 2023-07-06 PROCEDURE — 82172 ASSAY OF APOLIPOPROTEIN: CPT

## 2023-07-06 PROCEDURE — 82728 ASSAY OF FERRITIN: CPT

## 2023-07-06 PROCEDURE — 83540 ASSAY OF IRON: CPT

## 2023-07-08 LAB
APO B100 SERPL-MCNC: 96 MG/DL (ref 55–125)
T UPTAKE NL11712: 1.1 TBI (ref 0.8–1.3)
THYROGLOB AB SERPL-ACNC: <0.9 IU/ML (ref 0–4)

## 2023-07-10 LAB
COLLECT DURATION TIME SPEC: NORMAL HRS
CREAT 24H UR-MCNC: 167 MG/DL
CREAT 24H UR-MRATE: NORMAL MG/D (ref 500–1400)
LEPTIN SERPL-MCNC: 14.3 NG/ML (ref 0.5–15.2)
METHYLMALONATE UR-SCNC: 14.08 UMOL/L
METHYLMALONATE/CREAT UR-SRTO: 0.95 MMOL/MOL CRT (ref 0–3.6)
REF LAB TEST RESULTS: NORMAL
TOTAL VOLUME 1105: NORMAL ML

## 2023-07-12 ENCOUNTER — HOSPITAL ENCOUNTER (OUTPATIENT)
Dept: LAB | Facility: MEDICAL CENTER | Age: 63
End: 2023-07-12
Payer: COMMERCIAL

## 2023-07-12 DIAGNOSIS — M16.11 PRIMARY OSTEOARTHRITIS OF RIGHT HIP: ICD-10-CM

## 2023-07-12 DIAGNOSIS — E66.9 CLASS 2 OBESITY WITHOUT SERIOUS COMORBIDITY WITH BODY MASS INDEX (BMI) OF 35.0 TO 35.9 IN ADULT, UNSPECIFIED OBESITY TYPE: ICD-10-CM

## 2023-07-12 DIAGNOSIS — E03.8 SUBCLINICAL HYPOTHYROIDISM: ICD-10-CM

## 2023-07-12 LAB
ALBUMIN SERPL BCP-MCNC: 4.1 G/DL (ref 3.2–4.9)
ALBUMIN/GLOB SERPL: 1.6 G/DL
ALP SERPL-CCNC: 76 U/L (ref 30–99)
ALT SERPL-CCNC: 21 U/L (ref 2–50)
ANION GAP SERPL CALC-SCNC: 11 MMOL/L (ref 7–16)
AST SERPL-CCNC: 16 U/L (ref 12–45)
BILIRUB SERPL-MCNC: 1 MG/DL (ref 0.1–1.5)
BUN SERPL-MCNC: 17 MG/DL (ref 8–22)
CALCIUM ALBUM COR SERPL-MCNC: 9.7 MG/DL (ref 8.5–10.5)
CALCIUM SERPL-MCNC: 9.8 MG/DL (ref 8.5–10.5)
CHLORIDE SERPL-SCNC: 103 MMOL/L (ref 96–112)
CHOLEST SERPL-MCNC: 190 MG/DL (ref 100–199)
CO2 SERPL-SCNC: 26 MMOL/L (ref 20–33)
CREAT SERPL-MCNC: 0.76 MG/DL (ref 0.5–1.4)
FOLATE SERPL-MCNC: 15.5 NG/ML
GFR SERPLBLD CREATININE-BSD FMLA CKD-EPI: 88 ML/MIN/1.73 M 2
GLOBULIN SER CALC-MCNC: 2.6 G/DL (ref 1.9–3.5)
GLUCOSE SERPL-MCNC: 86 MG/DL (ref 65–99)
HDLC SERPL-MCNC: 40 MG/DL
LDLC SERPL CALC-MCNC: 125 MG/DL
POTASSIUM SERPL-SCNC: 3.9 MMOL/L (ref 3.6–5.5)
PROT SERPL-MCNC: 6.7 G/DL (ref 6–8.2)
SODIUM SERPL-SCNC: 140 MMOL/L (ref 135–145)
TRANSFERRIN SERPL-MCNC: 255 MG/DL (ref 200–370)
TRIGL SERPL-MCNC: 126 MG/DL (ref 0–149)
VIT B12 SERPL-MCNC: 205 PG/ML (ref 211–911)

## 2023-07-12 PROCEDURE — 84466 ASSAY OF TRANSFERRIN: CPT

## 2023-07-12 PROCEDURE — 82746 ASSAY OF FOLIC ACID SERUM: CPT

## 2023-07-12 PROCEDURE — 83525 ASSAY OF INSULIN: CPT

## 2023-07-12 PROCEDURE — 80053 COMPREHEN METABOLIC PANEL: CPT

## 2023-07-12 PROCEDURE — 82607 VITAMIN B-12: CPT

## 2023-07-12 PROCEDURE — 36415 COLL VENOUS BLD VENIPUNCTURE: CPT

## 2023-07-12 PROCEDURE — 80061 LIPID PANEL: CPT

## 2023-07-14 LAB — INSULIN P FAST SERPL-ACNC: 10 UIU/ML (ref 3–25)

## 2023-08-14 ENCOUNTER — APPOINTMENT (OUTPATIENT)
Dept: RADIOLOGY | Facility: MEDICAL CENTER | Age: 63
End: 2023-08-14
Attending: EMERGENCY MEDICINE
Payer: COMMERCIAL

## 2023-08-14 ENCOUNTER — HOSPITAL ENCOUNTER (OUTPATIENT)
Facility: MEDICAL CENTER | Age: 63
End: 2023-08-15
Attending: EMERGENCY MEDICINE | Admitting: STUDENT IN AN ORGANIZED HEALTH CARE EDUCATION/TRAINING PROGRAM
Payer: COMMERCIAL

## 2023-08-14 DIAGNOSIS — R42 DIZZINESS: ICD-10-CM

## 2023-08-14 DIAGNOSIS — H81.90 ACUTE VESTIBULAR SYNDROME: ICD-10-CM

## 2023-08-14 PROBLEM — E66.811 OBESITY (BMI 30.0-34.9): Status: ACTIVE | Noted: 2023-05-16

## 2023-08-14 PROBLEM — R73.9 HYPERGLYCEMIA: Status: ACTIVE | Noted: 2023-08-14

## 2023-08-14 PROBLEM — G45.9 TIA (TRANSIENT ISCHEMIC ATTACK): Status: ACTIVE | Noted: 2023-08-14

## 2023-08-14 PROBLEM — I10 HYPERTENSION: Status: ACTIVE | Noted: 2023-08-14

## 2023-08-14 LAB
ALBUMIN SERPL BCP-MCNC: 4.2 G/DL (ref 3.2–4.9)
ALBUMIN/GLOB SERPL: 1.6 G/DL
ALP SERPL-CCNC: 87 U/L (ref 30–99)
ALT SERPL-CCNC: 15 U/L (ref 2–50)
ANION GAP SERPL CALC-SCNC: 13 MMOL/L (ref 7–16)
AST SERPL-CCNC: 15 U/L (ref 12–45)
BASOPHILS # BLD AUTO: 0.9 % (ref 0–1.8)
BASOPHILS # BLD: 0.04 K/UL (ref 0–0.12)
BILIRUB SERPL-MCNC: 1 MG/DL (ref 0.1–1.5)
BUN SERPL-MCNC: 20 MG/DL (ref 8–22)
CALCIUM ALBUM COR SERPL-MCNC: 9.7 MG/DL (ref 8.5–10.5)
CALCIUM SERPL-MCNC: 9.9 MG/DL (ref 8.5–10.5)
CHLORIDE SERPL-SCNC: 104 MMOL/L (ref 96–112)
CO2 SERPL-SCNC: 20 MMOL/L (ref 20–33)
CREAT SERPL-MCNC: 0.67 MG/DL (ref 0.5–1.4)
EKG IMPRESSION: NORMAL
EOSINOPHIL # BLD AUTO: 0.11 K/UL (ref 0–0.51)
EOSINOPHIL NFR BLD: 2.4 % (ref 0–6.9)
ERYTHROCYTE [DISTWIDTH] IN BLOOD BY AUTOMATED COUNT: 40.3 FL (ref 35.9–50)
GFR SERPLBLD CREATININE-BSD FMLA CKD-EPI: 98 ML/MIN/1.73 M 2
GLOBULIN SER CALC-MCNC: 2.7 G/DL (ref 1.9–3.5)
GLUCOSE SERPL-MCNC: 104 MG/DL (ref 65–99)
HCT VFR BLD AUTO: 45.8 % (ref 37–47)
HGB BLD-MCNC: 16.1 G/DL (ref 12–16)
IMM GRANULOCYTES # BLD AUTO: 0.01 K/UL (ref 0–0.11)
IMM GRANULOCYTES NFR BLD AUTO: 0.2 % (ref 0–0.9)
LYMPHOCYTES # BLD AUTO: 1.47 K/UL (ref 1–4.8)
LYMPHOCYTES NFR BLD: 31.8 % (ref 22–41)
MCH RBC QN AUTO: 30.8 PG (ref 27–33)
MCHC RBC AUTO-ENTMCNC: 35.2 G/DL (ref 32.2–35.5)
MCV RBC AUTO: 87.7 FL (ref 81.4–97.8)
MONOCYTES # BLD AUTO: 0.47 K/UL (ref 0–0.85)
MONOCYTES NFR BLD AUTO: 10.2 % (ref 0–13.4)
NEUTROPHILS # BLD AUTO: 2.52 K/UL (ref 1.82–7.42)
NEUTROPHILS NFR BLD: 54.5 % (ref 44–72)
NRBC # BLD AUTO: 0 K/UL
NRBC BLD-RTO: 0 /100 WBC (ref 0–0.2)
PLATELET # BLD AUTO: 256 K/UL (ref 164–446)
PMV BLD AUTO: 9.7 FL (ref 9–12.9)
POTASSIUM SERPL-SCNC: 4 MMOL/L (ref 3.6–5.5)
PROT SERPL-MCNC: 6.9 G/DL (ref 6–8.2)
RBC # BLD AUTO: 5.22 M/UL (ref 4.2–5.4)
SODIUM SERPL-SCNC: 137 MMOL/L (ref 135–145)
TROPONIN T SERPL-MCNC: <6 NG/L (ref 6–19)
TROPONIN T SERPL-MCNC: <6 NG/L (ref 6–19)
WBC # BLD AUTO: 4.6 K/UL (ref 4.8–10.8)

## 2023-08-14 PROCEDURE — A9270 NON-COVERED ITEM OR SERVICE: HCPCS | Performed by: STUDENT IN AN ORGANIZED HEALTH CARE EDUCATION/TRAINING PROGRAM

## 2023-08-14 PROCEDURE — 85025 COMPLETE CBC W/AUTO DIFF WBC: CPT

## 2023-08-14 PROCEDURE — 700102 HCHG RX REV CODE 250 W/ 637 OVERRIDE(OP): Performed by: STUDENT IN AN ORGANIZED HEALTH CARE EDUCATION/TRAINING PROGRAM

## 2023-08-14 PROCEDURE — G0378 HOSPITAL OBSERVATION PER HR: HCPCS

## 2023-08-14 PROCEDURE — 700117 HCHG RX CONTRAST REV CODE 255: Performed by: EMERGENCY MEDICINE

## 2023-08-14 PROCEDURE — 99223 1ST HOSP IP/OBS HIGH 75: CPT | Performed by: STUDENT IN AN ORGANIZED HEALTH CARE EDUCATION/TRAINING PROGRAM

## 2023-08-14 PROCEDURE — 70496 CT ANGIOGRAPHY HEAD: CPT

## 2023-08-14 PROCEDURE — 80053 COMPREHEN METABOLIC PANEL: CPT

## 2023-08-14 PROCEDURE — 93005 ELECTROCARDIOGRAM TRACING: CPT

## 2023-08-14 PROCEDURE — 84484 ASSAY OF TROPONIN QUANT: CPT

## 2023-08-14 PROCEDURE — 93005 ELECTROCARDIOGRAM TRACING: CPT | Performed by: EMERGENCY MEDICINE

## 2023-08-14 PROCEDURE — 99285 EMERGENCY DEPT VISIT HI MDM: CPT

## 2023-08-14 PROCEDURE — 83036 HEMOGLOBIN GLYCOSYLATED A1C: CPT

## 2023-08-14 PROCEDURE — 70498 CT ANGIOGRAPHY NECK: CPT

## 2023-08-14 PROCEDURE — 36415 COLL VENOUS BLD VENIPUNCTURE: CPT

## 2023-08-14 RX ORDER — ASPIRIN 81 MG/1
81 TABLET ORAL DAILY
Status: DISCONTINUED | OUTPATIENT
Start: 2023-08-15 | End: 2023-08-15 | Stop reason: HOSPADM

## 2023-08-14 RX ORDER — ONDANSETRON 4 MG/1
4 TABLET, ORALLY DISINTEGRATING ORAL EVERY 4 HOURS PRN
Status: DISCONTINUED | OUTPATIENT
Start: 2023-08-14 | End: 2023-08-15 | Stop reason: HOSPADM

## 2023-08-14 RX ORDER — SODIUM CHLORIDE 9 MG/ML
500 INJECTION, SOLUTION INTRAVENOUS
Status: DISCONTINUED | OUTPATIENT
Start: 2023-08-14 | End: 2023-08-15 | Stop reason: HOSPADM

## 2023-08-14 RX ORDER — LABETALOL HYDROCHLORIDE 5 MG/ML
10 INJECTION, SOLUTION INTRAVENOUS
Status: DISCONTINUED | OUTPATIENT
Start: 2023-08-14 | End: 2023-08-15 | Stop reason: HOSPADM

## 2023-08-14 RX ORDER — LABETALOL HYDROCHLORIDE 5 MG/ML
10 INJECTION, SOLUTION INTRAVENOUS EVERY 4 HOURS PRN
Status: DISCONTINUED | OUTPATIENT
Start: 2023-08-14 | End: 2023-08-14

## 2023-08-14 RX ORDER — PROMETHAZINE HYDROCHLORIDE 25 MG/1
12.5-25 TABLET ORAL EVERY 4 HOURS PRN
Status: DISCONTINUED | OUTPATIENT
Start: 2023-08-14 | End: 2023-08-15 | Stop reason: HOSPADM

## 2023-08-14 RX ORDER — ATORVASTATIN CALCIUM 20 MG/1
20 TABLET, FILM COATED ORAL EVERY EVENING
Status: DISCONTINUED | OUTPATIENT
Start: 2023-08-14 | End: 2023-08-15 | Stop reason: HOSPADM

## 2023-08-14 RX ORDER — AMOXICILLIN 250 MG
2 CAPSULE ORAL 2 TIMES DAILY
Status: DISCONTINUED | OUTPATIENT
Start: 2023-08-15 | End: 2023-08-15 | Stop reason: HOSPADM

## 2023-08-14 RX ORDER — POLYETHYLENE GLYCOL 3350 17 G/17G
1 POWDER, FOR SOLUTION ORAL
Status: DISCONTINUED | OUTPATIENT
Start: 2023-08-14 | End: 2023-08-15 | Stop reason: HOSPADM

## 2023-08-14 RX ORDER — MECLIZINE HYDROCHLORIDE 25 MG/1
25 TABLET ORAL 3 TIMES DAILY PRN
Status: DISCONTINUED | OUTPATIENT
Start: 2023-08-14 | End: 2023-08-15 | Stop reason: HOSPADM

## 2023-08-14 RX ORDER — ACETAMINOPHEN 325 MG/1
650 TABLET ORAL EVERY 6 HOURS PRN
Status: DISCONTINUED | OUTPATIENT
Start: 2023-08-14 | End: 2023-08-15 | Stop reason: HOSPADM

## 2023-08-14 RX ORDER — BISACODYL 10 MG
10 SUPPOSITORY, RECTAL RECTAL
Status: DISCONTINUED | OUTPATIENT
Start: 2023-08-14 | End: 2023-08-15 | Stop reason: HOSPADM

## 2023-08-14 RX ORDER — PROCHLORPERAZINE EDISYLATE 5 MG/ML
5-10 INJECTION INTRAMUSCULAR; INTRAVENOUS EVERY 4 HOURS PRN
Status: DISCONTINUED | OUTPATIENT
Start: 2023-08-14 | End: 2023-08-15 | Stop reason: HOSPADM

## 2023-08-14 RX ORDER — ONDANSETRON 2 MG/ML
4 INJECTION INTRAMUSCULAR; INTRAVENOUS EVERY 4 HOURS PRN
Status: DISCONTINUED | OUTPATIENT
Start: 2023-08-14 | End: 2023-08-15 | Stop reason: HOSPADM

## 2023-08-14 RX ORDER — HYDRALAZINE HYDROCHLORIDE 20 MG/ML
10 INJECTION INTRAMUSCULAR; INTRAVENOUS
Status: DISCONTINUED | OUTPATIENT
Start: 2023-08-14 | End: 2023-08-15 | Stop reason: HOSPADM

## 2023-08-14 RX ORDER — PROMETHAZINE HYDROCHLORIDE 25 MG/1
12.5-25 SUPPOSITORY RECTAL EVERY 4 HOURS PRN
Status: DISCONTINUED | OUTPATIENT
Start: 2023-08-14 | End: 2023-08-15 | Stop reason: HOSPADM

## 2023-08-14 RX ADMIN — IOHEXOL 80 ML: 350 INJECTION, SOLUTION INTRAVENOUS at 17:48

## 2023-08-14 RX ADMIN — ATORVASTATIN CALCIUM 20 MG: 20 TABLET, FILM COATED ORAL at 19:39

## 2023-08-14 ASSESSMENT — ENCOUNTER SYMPTOMS
SENSORY CHANGE: 0
DIZZINESS: 1
CHILLS: 0
PALPITATIONS: 0
WEAKNESS: 1
MYALGIAS: 0
FOCAL WEAKNESS: 0
VOMITING: 0
SPEECH CHANGE: 0
SHORTNESS OF BREATH: 0
NAUSEA: 0
BLURRED VISION: 0
ABDOMINAL PAIN: 0
HEARTBURN: 0
DEPRESSION: 0
DOUBLE VISION: 0
FEVER: 0
BRUISES/BLEEDS EASILY: 0
COUGH: 0

## 2023-08-14 ASSESSMENT — LIFESTYLE VARIABLES
TOTAL SCORE: 0
EVER HAD A DRINK FIRST THING IN THE MORNING TO STEADY YOUR NERVES TO GET RID OF A HANGOVER: NO
ON A TYPICAL DAY WHEN YOU DRINK ALCOHOL HOW MANY DRINKS DO YOU HAVE: 2
ALCOHOL_USE: YES
TOTAL SCORE: 0
AVERAGE NUMBER OF DAYS PER WEEK YOU HAVE A DRINK CONTAINING ALCOHOL: 1
HAVE PEOPLE ANNOYED YOU BY CRITICIZING YOUR DRINKING: NO
SUBSTANCE_ABUSE: 0
DOES PATIENT WANT TO STOP DRINKING: NO
HAVE YOU EVER FELT YOU SHOULD CUT DOWN ON YOUR DRINKING: NO
TOTAL SCORE: 0
CONSUMPTION TOTAL: NEGATIVE
EVER FELT BAD OR GUILTY ABOUT YOUR DRINKING: NO
HOW MANY TIMES IN THE PAST YEAR HAVE YOU HAD 5 OR MORE DRINKS IN A DAY: 0

## 2023-08-14 ASSESSMENT — FIBROSIS 4 INDEX
FIB4 SCORE: 0.86
FIB4 SCORE: 0.95

## 2023-08-14 ASSESSMENT — PATIENT HEALTH QUESTIONNAIRE - PHQ9
2. FEELING DOWN, DEPRESSED, IRRITABLE, OR HOPELESS: NOT AT ALL
SUM OF ALL RESPONSES TO PHQ9 QUESTIONS 1 AND 2: 0
1. LITTLE INTEREST OR PLEASURE IN DOING THINGS: NOT AT ALL

## 2023-08-14 ASSESSMENT — PAIN DESCRIPTION - PAIN TYPE: TYPE: ACUTE PAIN

## 2023-08-14 NOTE — ED TRIAGE NOTES
Angeles Mcpherson  63 y.o.  Chief Complaint   Patient presents with    Dizziness     Sudden onset upon awakening at 0100 today  Feels like the whole room is spinning  Worsens when bends over    Headache     Sudden onset upon awakening at 0100 today  Denies recent falls/trauma  Denies visual changes/N/V  Pain 4/10     EKG completed in triage per protocol.    Ambulatory to triage with slow steady gait for above. A & O x 4, GCS 15. Mask in place.    Noted hypertensive in triage - no history of. States normal -120.    Triage process explained to patient, apologized for wait time, and returned to lobby.

## 2023-08-14 NOTE — ED PROVIDER NOTES
"  ER Provider Note    Scribed for Jared Marlow M.d. by Clifton Hare. 8/14/2023  3:10 PM    Primary Care Provider: Jono Rodriguez D.N.P.    CHIEF COMPLAINT  Chief Complaint   Patient presents with    Dizziness     Sudden onset upon awakening at 0100 today  Feels like the whole room is spinning  Worsens when bends over    Headache     Sudden onset upon awakening at 0100 today  Denies recent falls/trauma  Denies visual changes/N/V  Pain 4/10     EXTERNAL RECORDS REVIEWED  Outpatient Notes indicate the patient was seen here on 12/25/22 secondary to a mechanical ground level fall while carrying gifts upstairs. She fell headfirst into concrete and received 5 sutures, which were subsequently removed 10 days later. She had a normal Head and Maxillofacial CT at this time.    HPI/ROS  LIMITATION TO HISTORY   Select: : None  OUTSIDE HISTORIAN(S):  None    Angeles Mcpherson is a 63 y.o. female who presents to the ED for evaluation of severe dizziness onset this morning upon waking up. The patient states she also has 2/10 sudden top of head headache/pressure, but denies any chest pain, difficulty finding words, burning sensation with urination, blood in her urine, ringing in the ears, neck pain, visual changes, nausea, or vomiting. She describes waking up in the middle of the night to use the restroom, and being severely off balance. She states she ran into multiple walls and was unable to stand straight up. She notes her dizziness feels like a \"tilting deck\" underneath her feet. Her symptoms are exacerbated by bending over. She states that she very infrequently gets headaches. She has no known medical history, but does describe being evaluated for a head injury with sutures placed in December of 2022 secondary to a mechanical ground level fall. She had a normal head CT at that time.    PAST MEDICAL HISTORY  Past Medical History:   Diagnosis Date    Arthritis      SURGICAL HISTORY  Past Surgical History:   Procedure Laterality Date " "   CHOLECYSTECTOMY      HIP ARTHROPLASTY TOTAL Right      FAMILY HISTORY  Family History   Problem Relation Age of Onset    Cancer Mother         thyroid    Dementia Mother      SOCIAL HISTORY   reports that she has never smoked. She has never used smokeless tobacco. She reports current alcohol use. She reports that she does not use drugs.    CURRENT MEDICATIONS  No current outpatient medications     ALLERGIES  Patient has no known allergies.    PHYSICAL EXAM  BP (!) 162/72   Pulse 66   Temp 35.9 °C (96.7 °F) (Temporal)   Resp 16   Ht 1.778 m (5' 10\")   Wt 105 kg (231 lb 14.8 oz)   SpO2 97%   BMI 33.28 kg/m²   Gen: Alert, no acute distress  HEENT: ATNC. TMs visualized. Non-erythematous, no edema. No hemotympanum. Posterior oropharynx moist.   Neck: trachea midline. Supple. Full range of motion without tenderness.  Resp: no respiratory distress. Lungs are clear.   CV: No JVD, heart regular rate and rhythm.  No pedal edema.  Equal radial pulses.  No murmurs  Abd: non-distended, soft, nontender  Ext: No deformities  Psych: normal mood  Neuro: speech fluent, Equal  strength bilaterally. Normal heel to shin. Normal dorsiflexion and plantar flexion. No continuous nystagmus, 3-4 beats of rightward nystagmus on right gaze, 1 beat of leftward nystagmus on left gaze. Normal bilateral shoulder raise. Normal auditory perception. Cranial nerves II-XII intact. Stroke scale 0. Normal cerebellum function, slight difficulty with tandem gait but able to complete it. Normal gait.    DIAGNOSTIC STUDIES    Labs:   Results for orders placed or performed during the hospital encounter of 08/14/23   CBC with Differential   Result Value Ref Range    WBC 4.6 (L) 4.8 - 10.8 K/uL    RBC 5.22 4.20 - 5.40 M/uL    Hemoglobin 16.1 (H) 12.0 - 16.0 g/dL    Hematocrit 45.8 37.0 - 47.0 %    MCV 87.7 81.4 - 97.8 fL    MCH 30.8 27.0 - 33.0 pg    MCHC 35.2 32.2 - 35.5 g/dL    RDW 40.3 35.9 - 50.0 fL    Platelet Count 256 164 - 446 K/uL    MPV " 9.7 9.0 - 12.9 fL    Neutrophils-Polys 54.50 44.00 - 72.00 %    Lymphocytes 31.80 22.00 - 41.00 %    Monocytes 10.20 0.00 - 13.40 %    Eosinophils 2.40 0.00 - 6.90 %    Basophils 0.90 0.00 - 1.80 %    Immature Granulocytes 0.20 0.00 - 0.90 %    Nucleated RBC 0.00 0.00 - 0.20 /100 WBC    Neutrophils (Absolute) 2.52 1.82 - 7.42 K/uL    Lymphs (Absolute) 1.47 1.00 - 4.80 K/uL    Monos (Absolute) 0.47 0.00 - 0.85 K/uL    Eos (Absolute) 0.11 0.00 - 0.51 K/uL    Baso (Absolute) 0.04 0.00 - 0.12 K/uL    Immature Granulocytes (abs) 0.01 0.00 - 0.11 K/uL    NRBC (Absolute) 0.00 K/uL   Complete Metabolic Panel (CMP)   Result Value Ref Range    Sodium 137 135 - 145 mmol/L    Potassium 4.0 3.6 - 5.5 mmol/L    Chloride 104 96 - 112 mmol/L    Co2 20 20 - 33 mmol/L    Anion Gap 13.0 7.0 - 16.0    Glucose 104 (H) 65 - 99 mg/dL    Bun 20 8 - 22 mg/dL    Creatinine 0.67 0.50 - 1.40 mg/dL    Calcium 9.9 8.5 - 10.5 mg/dL    Correct Calcium 9.7 8.5 - 10.5 mg/dL    AST(SGOT) 15 12 - 45 U/L    ALT(SGPT) 15 2 - 50 U/L    Alkaline Phosphatase 87 30 - 99 U/L    Total Bilirubin 1.0 0.1 - 1.5 mg/dL    Albumin 4.2 3.2 - 4.9 g/dL    Total Protein 6.9 6.0 - 8.2 g/dL    Globulin 2.7 1.9 - 3.5 g/dL    A-G Ratio 1.6 g/dL   Troponins NOW   Result Value Ref Range    Troponin T <6 6 - 19 ng/L   Troponins in two (2) hours   Result Value Ref Range    Troponin T <6 6 - 19 ng/L   ESTIMATED GFR   Result Value Ref Range    GFR (CKD-EPI) 98 >60 mL/min/1.73 m 2   EKG   Result Value Ref Range    Report       [facility] Emergency Dept.    Test Date:  2023  Pt Name:    ELODIA IZAGUIRRE                Department: ER  MRN:        [id number]                      Room:  Gender:     Female                       Technician: [id number]  :        1960                   Requested By:ER TRIAGE PROTOCOL  Order #:    [id number]                    Reading MD: Jared Marlow    Measurements  Intervals                                Axis  Rate:       54                            P:          46  CO:         163                          QRS:        74  QRSD:       96                           T:          57  QT:         408  QTc:        387    Interpretive Statements  Sinus bradycardia  Probable left atrial enlargement  No previous ECG available for comparison  Electronically Signed On 08- 15:23:16 PDT by Jraed Marlow       EKG:   I have independently interpreted this EKG as detailed above.     Radiology:   The attending emergency physician has independently interpreted the diagnostic imaging associated with this visit and am waiting the final reading from the radiologist.   Preliminary interpretation is a follows: CTA head: No intracranial bleed  Radiologist interpretation:   CT-CTA NECK WITH & W/O-POST PROCESSING   Final Result      CT angiogram of the neck within normal limits.      CT-CTA HEAD WITH & W/O-POST PROCESS   Final Result      CT angiogram of the [place] within normal limits.      MR-BRAIN-W/O    (Results Pending)   EC-ECHOCARDIOGRAM COMPLETE W/O CONT    (Results Pending)     COURSE & MEDICAL DECISION MAKING     ED Observation Status? Yes; I am placing the patient in to an observation status due to a diagnostic uncertainty as well as therapeutic intensity. Patient placed in observation status at 3:15 PM, 8/14/2023.     Observation plan is as follows: Follow-up labs, imaging, determination of disposition    Upon Reevaluation, the patient's condition has: not improved; and will be escalated to hospitalization.    Patient discharged from ED Observation status at 6:34 PM (Time) 8/14/2023 (Date).     INITIAL ASSESSMENT, COURSE AND PLAN  Care Narrative: Patient arrives with persistent disequilibrium.  She is not demonstrating signs that would allow me to perform a hints exam to evaluate for possible peripheral etiology.  No evidence of otitis media.  This does not appear to be triggered to fit with BPPV.  She does have some slight difficulty with  ambulation.  Although she is healthy, she is having advancing age, which increases the risk of stroke or other central process.  CTA of the head neck performed demonstrate no intracranial bleed, no evidence of posterior circulation defect.  She has no signs of ACS, low suspicion for DVT/PE.  She has no critical anemia.  No evidence of infection.    On reassessment, the patient continues to be symptomatic.  At this point, I am concerned about a very small posterior circulation stroke given her persistent symptoms and inability to demonstrate obvious peripheral etiology for her acute and persistent disequilibrium symptoms.    3:15 PM - Patient was evaluated at bedside. Explained the plan to obtain a CT of her brain and blood vessels with contrast to evaluate for atypical stroke presentations. Patient verbalizes understanding and support with my plan of care.       6:13 PM - Patient was reevaluated at bedside. Discussed lab and radiology results with the patient and informed them that her CT scans appear within acceptable limits, but do not completely rule out small strokes. Explained that we can rule this out with MRI but this would require inpatient admission. The patient was given an opportunity to ask questions. Through shared decision making, the patient and I agreed to proceed with inpatient admission for MRI.    6:25 PM - Paged Hospitalist.    6:34 PM - I discussed the patient's case and the above findings with Dr. Lew (Hospitalist) who agrees to evaluate the patient for hospitalization.       DISPOSITION AND DISCUSSIONS  I have discussed management of the patient with the following physicians and SHERYL's:  Dr. Lew (Hospitalist)        Decision tools and prescription drugs considered including, but not limited to: NIH Stroke Scale 0 and HEART Score 1 .    DISPOSITION:  Patient will be hospitalized by Dr. Lew (Hospitalist) in guarded condition.     FINAL DIANGOSIS  1. Dizziness    2. Acute vestibular  syndrome         Clifton DELUCA (Bertramibreshma), am scribing for, and in the presence of, Jared Marlow M.D..    Electronically signed by: Clifton Hare (Nelly), 8/14/2023    Jared DELUCA M.D. personally performed the services described in this documentation, as scribed by Clifton Hare in my presence, and it is both accurate and complete.      The note accurately reflects work and decisions made by me.  Jared Marlow M.D.  8/14/2023  8:17 PM

## 2023-08-15 ENCOUNTER — PHARMACY VISIT (OUTPATIENT)
Dept: PHARMACY | Facility: MEDICAL CENTER | Age: 63
End: 2023-08-15
Payer: COMMERCIAL

## 2023-08-15 ENCOUNTER — APPOINTMENT (OUTPATIENT)
Dept: CARDIOLOGY | Facility: MEDICAL CENTER | Age: 63
End: 2023-08-15
Attending: STUDENT IN AN ORGANIZED HEALTH CARE EDUCATION/TRAINING PROGRAM
Payer: COMMERCIAL

## 2023-08-15 VITALS
HEIGHT: 70 IN | OXYGEN SATURATION: 96 % | WEIGHT: 233.91 LBS | SYSTOLIC BLOOD PRESSURE: 92 MMHG | DIASTOLIC BLOOD PRESSURE: 54 MMHG | HEART RATE: 48 BPM | TEMPERATURE: 97.5 F | BODY MASS INDEX: 33.49 KG/M2 | RESPIRATION RATE: 18 BRPM

## 2023-08-15 LAB
ALBUMIN SERPL BCP-MCNC: 3.7 G/DL (ref 3.2–4.9)
BASOPHILS # BLD AUTO: 0.7 % (ref 0–1.8)
BASOPHILS # BLD: 0.04 K/UL (ref 0–0.12)
BUN SERPL-MCNC: 24 MG/DL (ref 8–22)
CALCIUM ALBUM COR SERPL-MCNC: 10 MG/DL (ref 8.5–10.5)
CALCIUM SERPL-MCNC: 9.8 MG/DL (ref 8.5–10.5)
CHLORIDE SERPL-SCNC: 107 MMOL/L (ref 96–112)
CHOLEST SERPL-MCNC: 186 MG/DL (ref 100–199)
CO2 SERPL-SCNC: 23 MMOL/L (ref 20–33)
CREAT SERPL-MCNC: 0.72 MG/DL (ref 0.5–1.4)
EOSINOPHIL # BLD AUTO: 0.16 K/UL (ref 0–0.51)
EOSINOPHIL NFR BLD: 2.7 % (ref 0–6.9)
ERYTHROCYTE [DISTWIDTH] IN BLOOD BY AUTOMATED COUNT: 41.1 FL (ref 35.9–50)
EST. AVERAGE GLUCOSE BLD GHB EST-MCNC: 114 MG/DL
GFR SERPLBLD CREATININE-BSD FMLA CKD-EPI: 94 ML/MIN/1.73 M 2
GLUCOSE SERPL-MCNC: 116 MG/DL (ref 65–99)
HBA1C MFR BLD: 5.6 % (ref 4–5.6)
HCT VFR BLD AUTO: 43.2 % (ref 37–47)
HDLC SERPL-MCNC: 35 MG/DL
HGB BLD-MCNC: 14.6 G/DL (ref 12–16)
IMM GRANULOCYTES # BLD AUTO: 0.01 K/UL (ref 0–0.11)
IMM GRANULOCYTES NFR BLD AUTO: 0.2 % (ref 0–0.9)
LDLC SERPL CALC-MCNC: 119 MG/DL
LV EJECT FRACT  99904: 60
LV EJECT FRACT MOD 2C 99903: 53.77
LV EJECT FRACT MOD 4C 99902: 62.41
LV EJECT FRACT MOD BP 99901: 52.38
LYMPHOCYTES # BLD AUTO: 2.1 K/UL (ref 1–4.8)
LYMPHOCYTES NFR BLD: 36.1 % (ref 22–41)
MAGNESIUM SERPL-MCNC: 2.1 MG/DL (ref 1.5–2.5)
MCH RBC QN AUTO: 30.4 PG (ref 27–33)
MCHC RBC AUTO-ENTMCNC: 33.8 G/DL (ref 32.2–35.5)
MCV RBC AUTO: 89.8 FL (ref 81.4–97.8)
MONOCYTES # BLD AUTO: 0.54 K/UL (ref 0–0.85)
MONOCYTES NFR BLD AUTO: 9.3 % (ref 0–13.4)
NEUTROPHILS # BLD AUTO: 2.97 K/UL (ref 1.82–7.42)
NEUTROPHILS NFR BLD: 51 % (ref 44–72)
NRBC # BLD AUTO: 0 K/UL
NRBC BLD-RTO: 0 /100 WBC (ref 0–0.2)
PHOSPHATE SERPL-MCNC: 4.1 MG/DL (ref 2.5–4.5)
PLATELET # BLD AUTO: 219 K/UL (ref 164–446)
PMV BLD AUTO: 9.9 FL (ref 9–12.9)
POTASSIUM SERPL-SCNC: 3.7 MMOL/L (ref 3.6–5.5)
RBC # BLD AUTO: 4.81 M/UL (ref 4.2–5.4)
SODIUM SERPL-SCNC: 141 MMOL/L (ref 135–145)
TRIGL SERPL-MCNC: 160 MG/DL (ref 0–149)
WBC # BLD AUTO: 5.8 K/UL (ref 4.8–10.8)

## 2023-08-15 PROCEDURE — 97535 SELF CARE MNGMENT TRAINING: CPT

## 2023-08-15 PROCEDURE — 80061 LIPID PANEL: CPT

## 2023-08-15 PROCEDURE — G0378 HOSPITAL OBSERVATION PER HR: HCPCS

## 2023-08-15 PROCEDURE — 83735 ASSAY OF MAGNESIUM: CPT

## 2023-08-15 PROCEDURE — 85025 COMPLETE CBC W/AUTO DIFF WBC: CPT

## 2023-08-15 PROCEDURE — 93306 TTE W/DOPPLER COMPLETE: CPT | Mod: 26 | Performed by: INTERNAL MEDICINE

## 2023-08-15 PROCEDURE — 93306 TTE W/DOPPLER COMPLETE: CPT

## 2023-08-15 PROCEDURE — RXMED WILLOW AMBULATORY MEDICATION CHARGE: Performed by: STUDENT IN AN ORGANIZED HEALTH CARE EDUCATION/TRAINING PROGRAM

## 2023-08-15 PROCEDURE — A9270 NON-COVERED ITEM OR SERVICE: HCPCS | Performed by: STUDENT IN AN ORGANIZED HEALTH CARE EDUCATION/TRAINING PROGRAM

## 2023-08-15 PROCEDURE — 80069 RENAL FUNCTION PANEL: CPT

## 2023-08-15 PROCEDURE — 700102 HCHG RX REV CODE 250 W/ 637 OVERRIDE(OP): Performed by: STUDENT IN AN ORGANIZED HEALTH CARE EDUCATION/TRAINING PROGRAM

## 2023-08-15 PROCEDURE — 99239 HOSP IP/OBS DSCHRG MGMT >30: CPT | Performed by: STUDENT IN AN ORGANIZED HEALTH CARE EDUCATION/TRAINING PROGRAM

## 2023-08-15 RX ORDER — ONDANSETRON 4 MG/1
4 TABLET, ORALLY DISINTEGRATING ORAL EVERY 4 HOURS PRN
Qty: 10 TABLET | Refills: 0 | Status: SHIPPED | OUTPATIENT
Start: 2023-08-15 | End: 2023-08-17

## 2023-08-15 RX ORDER — MECLIZINE HYDROCHLORIDE 25 MG/1
25 TABLET ORAL 3 TIMES DAILY PRN
Qty: 30 TABLET | Refills: 0 | Status: SHIPPED | OUTPATIENT
Start: 2023-08-15 | End: 2023-08-17

## 2023-08-15 RX ADMIN — ASPIRIN 81 MG: 81 TABLET, COATED ORAL at 05:42

## 2023-08-15 RX ADMIN — MECLIZINE HYDROCHLORIDE 25 MG: 25 TABLET ORAL at 10:59

## 2023-08-15 ASSESSMENT — ACTIVITIES OF DAILY LIVING (ADL): TOILETING: INDEPENDENT

## 2023-08-15 ASSESSMENT — PAIN DESCRIPTION - PAIN TYPE: TYPE: ACUTE PAIN

## 2023-08-15 NOTE — THERAPY
Occupational Therapy Contact Note    Patient Name: Angeles Mcpherson  Age:  63 y.o., Sex:  female  Medical Record #: [id number]  Today's Date: 8/15/2023    OT edu/screen complete. No OT eval initiated. Pt independent prior to hospitalization and reports that family and friends are able to provide assist as needed. Spoke to pt in room, she has no further concerns regarding her ability to safely complete ADLs and txfs independently after DC once medically clear. Anticipate pt will have no further OT needs after DC.       Prior Living Situation   Prior Services Home-Independent   Housing / Facility 1 Story House   Steps Into Home 5   Steps In Home 0   Rail Both Rail (Steps into Home)   Bathroom Set up Bathtub / Shower Combination   Equipment Owned None   Lives with - Patient's Self Care Capacity Alone and Able to Care For Self   Comments Pt currently resides alone, but has family and friends who can provide assist as needed.   Prior Level of ADL Function   Self Feeding Independent   Grooming / Hygiene Independent   Bathing Independent   Dressing Independent   Toileting Independent   Prior Level of IADL Function   Medication Management Independent   Laundry Independent   Kitchen Mobility Independent   Finances Independent   Home Management Independent   Shopping Independent   Prior Level Of Mobility Independent Without Device in Community;Independent Without Device in Home   Driving / Transportation Driving Independent   Occupation (Pre-Hospital Vocational) Employed Full Time   Education Group   Education Provided Role of Occupational Therapist;Activities of Daily Living;Home Safety   Role of Occupational Therapist Patient Response Patient;Acceptance;Explanation;Verbal Demonstration   Home Safety Patient Response Patient;Acceptance;Explanation;Verbal Demonstration  (Safely entering/exiting tub with 3 points of contact to reduce risk of falls)   ADL Patient Response Patient;Acceptance;Explanation;Verbal Demonstration        Infliximab Counseling:  I discussed with the patient the risks of infliximab including but not limited to myelosuppression, immunosuppression, autoimmune hepatitis, demyelinating diseases, lymphoma, and serious infections.  The patient understands that monitoring is required including a PPD at baseline and must alert us or the primary physician if symptoms of infection or other concerning signs are noted.

## 2023-08-15 NOTE — H&P
[facility] Medicine History & Physical Note    Date of Service  8/14/2023    Primary Care Physician  Jono Rodriguez D.N.P.    Consultants  None    Code Status  Full Code    Chief Complaint  Chief Complaint   Patient presents with    Dizziness     Sudden onset upon awakening at 0100 today  Feels like the whole room is spinning  Worsens when bends over    Headache     Sudden onset upon awakening at 0100 today  Denies recent falls/trauma  Denies visual changes/N/V  Pain 4/10       History of Presenting Illness  Angeles Mcpherson is a 63 y.o. female w no PMH ith who presented 8/14/2023 with evaluation for dizziness.  Patient reported having sudden onset of dizziness, while standing up, feel as though the room is spinning around her.  She reported unsteady gait, difficulty ambulating due to persistent dizziness.  Therefore, came to ER for evaluation.  No acute findings noted on CTA head and neck.  Her SBP above 160 in ER.  Admitted to medicine service for observation.    I discussed the plan of care with patient and bedside RN.    Review of Systems  Review of Systems   Constitutional:  Negative for chills and fever.   HENT:  Negative for hearing loss and tinnitus.    Eyes:  Negative for blurred vision and double vision.   Respiratory:  Negative for cough and shortness of breath.    Cardiovascular:  Negative for chest pain and palpitations.   Gastrointestinal:  Negative for abdominal pain, heartburn, nausea and vomiting.   Genitourinary:  Negative for dysuria and hematuria.   Musculoskeletal:  Negative for joint pain and myalgias.   Skin:  Negative for itching and rash.   Neurological:  Positive for dizziness and weakness. Negative for sensory change, speech change and focal weakness.   Endo/Heme/Allergies:  Negative for environmental allergies. Does not bruise/bleed easily.   Psychiatric/Behavioral:  Negative for depression and substance abuse.    All other systems reviewed and are negative.      Past Medical History   has a  past medical history of Arthritis.    Surgical History   has a past surgical history that includes hip arthroplasty total (Right) and cholecystectomy.     Family History  family history includes Cancer in her mother; Dementia in her mother.   Family history reviewed with patient. There is family history that is pertinent to the chief complaint.  Mother had TIA    Social History   reports that she has never smoked. She has never used smokeless tobacco. She reports current alcohol use. She reports that she does not use drugs.    Allergies  No Known Allergies    Medications  None       Physical Exam  Temp:  [35.9 °C (96.7 °F)] 35.9 °C (96.7 °F)  Pulse:  [66] 66  Resp:  [16] 16  BP: (162)/(72) 162/72  SpO2:  [97 %] 97 %  Blood Pressure: (!) 162/72   Temperature: 35.9 °C (96.7 °F)   Pulse: 66   Respiration: 16   Pulse Oximetry: 97 %       Physical Exam  Vitals and nursing note reviewed.   Constitutional:       General: She is not in acute distress.     Appearance: She is obese.   HENT:      Head: Normocephalic and atraumatic.      Nose: Nose normal.      Mouth/Throat:      Mouth: Mucous membranes are moist.      Pharynx: Oropharynx is clear.   Eyes:      General: No scleral icterus.     Extraocular Movements: Extraocular movements intact.   Cardiovascular:      Rate and Rhythm: Normal rate and regular rhythm.      Pulses: Normal pulses.      Heart sounds:      No friction rub.   Pulmonary:      Effort: No respiratory distress.      Breath sounds: No wheezing or rales.   Chest:      Chest wall: No tenderness.   Abdominal:      General: There is no distension.      Tenderness: There is no abdominal tenderness. There is no guarding or rebound.   Musculoskeletal:         General: No swelling or tenderness. Normal range of motion.      Cervical back: Neck supple. No tenderness.   Skin:     General: Skin is warm and dry.      Capillary Refill: Capillary refill takes less than 2 seconds.   Neurological:      General: No focal  deficit present.      Mental Status: She is alert and oriented to person, place, and time.   Psychiatric:         Mood and Affect: Mood normal.         Laboratory:  Recent Labs     08/14/23  1349   WBC 4.6*   RBC 5.22   HEMOGLOBIN 16.1*   HEMATOCRIT 45.8   MCV 87.7   MCH 30.8   MCHC 35.2   RDW 40.3   PLATELETCT 256   MPV 9.7     Recent Labs     08/14/23  1349   SODIUM 137   POTASSIUM 4.0   CHLORIDE 104   CO2 20   GLUCOSE 104*   BUN 20   CREATININE 0.67   CALCIUM 9.9     Recent Labs     08/14/23  1349   ALTSGPT 15   ASTSGOT 15   ALKPHOSPHAT 87   TBILIRUBIN 1.0   GLUCOSE 104*         No results for input(s): NTPROBNP in the last 72 hours.      Recent Labs     08/14/23  1349 08/14/23  1555   TROPONINT <6 <6       Imaging:  CT-CTA NECK WITH & W/O-POST PROCESSING   Final Result      CT angiogram of the neck within normal limits.      CT-CTA HEAD WITH & W/O-POST PROCESS   Final Result      CT angiogram of the [place] within normal limits.      MR-BRAIN-W/O    (Results Pending)   EC-ECHOCARDIOGRAM COMPLETE W/O CONT    (Results Pending)       X-Ray:  I have personally reviewed the images and compared with prior images.  EKG:  I have personally reviewed the images and compared with prior images.    Assessment/Plan:  Justification for Admission Status  I anticipate this patient is appropriate for observation status at this time.        * Dizziness- (present on admission)  Assessment & Plan  Probable vertigo-like syndrome  Possible TIA, CVA  No acute findings noted on CTA head and neck  Check echo, MRI brain  ASA/statin  PRN meclizine    Hypertension  Assessment & Plan  SBP >160 in ED  Permissive HTN for now  May consider initiation of BP med at discharge    TIA (transient ischemic attack)  Assessment & Plan  Probable TIA  R/o CVA    Hyperglycemia  Assessment & Plan  Check HbA1c    Obesity (BMI 30.0-34.9)  Assessment & Plan  Diet and lifestyle modification  Body mass index is 33.28 kg/m².          VTE prophylaxis:  SCDs/TEDs

## 2023-08-15 NOTE — DISCHARGE PLANNING
[facility] TCN chart review completed. Due to low LACE 58 as well as patients currently documented level of function, no TCN needs anticipated in patient discharge planning at this time. Should post acute discharge needs arise warranting TCN involvement, please contact TCN team via Voalte. Thank you.

## 2023-08-15 NOTE — PROGRESS NOTES
Assumed care of patient and heard report from Iris PETER.  Admit profile and assessment completed, patient is A&Ox 4, continuous telemetry monitoring in place, reports no pain or nausea at this time, still experiencing dizziness, on room air, NADN  Patient updated on plan of care and all needs addressed at this time.  Upper bed rails in place, bed in lowest locked setting, non slip socks on, belongings and call light in reach, falls precaution and safety education reinforced, patient verbalized understanding.

## 2023-08-15 NOTE — DISCHARGE INSTRUCTIONS
Discharge Instructions    Discharged to home by car with self. Discharged via wheelchair, hospital escort: Refused.  Special equipment needed: Not Applicable    Be sure to schedule a follow-up appointment with your primary care doctor or any specialists as instructed.     Discharge Plan:        I understand that a diet low in cholesterol, fat, and sodium is recommended for good health. Unless I have been given specific instructions below for another diet, I accept this instruction as my diet prescription.   Other diet: regular    Special Instructions: None    -Is this patient being discharged with medication to prevent blood clots?  No    Is patient discharged on Warfarin / Coumadin?   No

## 2023-08-15 NOTE — THERAPY
Speech Language Therapy Contact Note    Patient Name: Angeles Mcpherson  Age:  63 y.o., Sex:  female  Medical Record #: [id number]  Today's Date: 8/15/2023    Per EMR, SLP orders for cinical bedside swallow and cognitive linguistic communcation evaluation have been canceled per M.D. No acute SLP needs. Should change in status occur, please consult SLP. Thank you.      08/15/23 1120   Treatment Variance   Reason For Missed Therapy Non-Medical - Other (Please Comment)  (Per EMR, SLP orders for cinical bedside swallow and cognitive linguistic communcation evaluation have been canceled per M.D. No acute SLP needs. Should change in status occur, please consult SLP. Thank you.)   Total Treatment Time   SLP Time Spent Yes   SLP Missed Visit (Mins) 7   Initial Contact Note    Initial Contact Note  Order Received and Verified. Speech Therapy Evaluation NOT Completed Because Patient Does Not Require Acute Speech Therapy at this Time.   Interdisciplinary Plan of Care Collaboration   IDT Collaboration with  Other (See Comments)  (Per EMR)

## 2023-08-15 NOTE — DISCHARGE PLANNING
[facility] Transitional Care Coordination    Referral from:  Dr. Chao Lew  Insurance Provider on Facesheet:  [facility]  Potential Rehab diagnosis:  Possible TIA/CVA    Chart review indicates patient may have ongoing medical management, limited therapy need for IRF level care    D/C Support:  TBD    No Physiatry consult per protocol.  Current documentation does not reflect ongoing acute therapy need in 2 of 3 disciplines meeting CMS criteria for IRF level care.  If there are interval changes, please reach out to Rehab TCC [id number].  Please reach out if PMR referral requested for medical management.     Thank you for the referral.

## 2023-08-15 NOTE — ASSESSMENT & PLAN NOTE
Probable vertigo-like syndrome  Possible TIA, CVA  No acute findings noted on CTA head and neck  Check echo, MRI brain  ASA/statin  PRN meclizine

## 2023-08-15 NOTE — CARE PLAN
The patient is Stable - Low risk of patient condition declining or worsening    Shift Goals  Problem: Knowledge Deficit - Stroke Education  Goal: Patient's knowledge of stroke and risk factors will improve  Description: Target End Date:  1-3 days or as soon as patient condition allows    Document in Patient Education    1.  Stroke education booklet provided  2.  Education regarding EMS activation, need for follow up, medication prescribed at discharge, risk factors for stroke/lifestyle modifications, warning signs and symptoms of stroke provided  Outcome: Progressing     Problem: Pain - Standard  Goal: Alleviation of pain or a reduction in pain to the patient’s comfort goal  Description: Target End Date:  Prior to discharge or change in level of care    Document on Vitals flowsheet    1.  Document pain using the appropriate pain scale per order or unit policy  2.  Educate and implement non-pharmacologic comfort measures (i.e. relaxation, distraction, massage, cold/heat therapy, etc.)  3.  Pain management medications as ordered  4.  Reassess pain after pain med administration per policy  5.  If opiods administered assess patient's response to pain medication is appropriate per POSS sedation scale  6.  Follow pain management plan developed in collaboration with patient and interdisciplinary team (including palliative care or pain specialists if applicable)  Outcome: Progressing

## 2023-08-15 NOTE — ED NOTES
Med Rec complete per patient   Allergies reviewed  No oral antibiotics in the last 30 days  Preferred pharmacy: [facility] on [address]    Pt states she is not taking any RX medication and hasn't taken any OTC in past 30 days

## 2023-08-15 NOTE — PROGRESS NOTES
Patient questions answered discussed follow up appointments. Medications provided. Patient discharge to visit her mother in [facility]. Refused escort.

## 2023-08-15 NOTE — PROGRESS NOTES
4 Eyes Skin Assessment Completed by Lina RN and ROME Dillon.    Head WDL  Ears WDL  Nose WDL  Mouth WDL  Neck WDL  Breast/Chest WDL  Shoulder Blades WDL  Spine WDL  (R) Arm/Elbow/Hand WDL  (L) Arm/Elbow/Hand WDL  Abdomen WDL  Groin WDL  Scrotum/Coccyx/Buttocks WDL  (R) Leg WDL  (L) Leg WDL  (R) Heel/Foot/Toe WDL  (L) Heel/Foot/Toe WDL          Devices In Places Tele Box, Blood Pressure Cuff, and Pulse Ox      Interventions In Place Pillows    Possible Skin Injury No    Pictures Uploaded Into Epic N/A  Wound Consult Placed N/A  RN Wound Prevention Protocol Ordered No

## 2023-08-15 NOTE — CARE PLAN
The patient is Watcher - Medium risk of patient condition declining or worsening    Shift Goals  Clinical Goals: MRI, Q4 neuro checks, NIHSS  Patient Goals: rest, comfort, d/c    Progress made toward(s) clinical / shift goals:  Pain management progressing through medication (see MAR), rest, positioning, and distraction.  Patient educated on plan of care, medications, side effects, non-pharmalogical pain control, and safety/fall precautions.  Patient's neuro status remains intact.      Problem: Pain - Standard  Goal: Alleviation of pain or a reduction in pain to the patient’s comfort goal  Description: Target End Date:  Prior to discharge or change in level of care    Document on Vitals flowsheet    1.  Document pain using the appropriate pain scale per order or unit policy  2.  Educate and implement non-pharmacologic comfort measures (i.e. relaxation, distraction, massage, cold/heat therapy, etc.)  3.  Pain management medications as ordered  4.  Reassess pain after pain med administration per policy  5.  If opiods administered assess patient's response to pain medication is appropriate per POSS sedation scale  6.  Follow pain management plan developed in collaboration with patient and interdisciplinary team (including palliative care or pain specialists if applicable)  Outcome: Progressing     Problem: Neuro Status  Goal: Neuro status will remain stable or improve  Description: Target End Date:  Prior to discharge or change in level of care    Document on Neuro assessment in the Assessment flowsheet    1.  Assess and monitor neurologic status per provider order/protocol/unit policy  2.  Assess level of consciousness and orientation  3.  Assess for speech, dysarthria, dysphagia, facial symmetry  4.  Assess visual field, eye movements, gaze preference, pupil reaction and size  5.  Assess muscle strength and motor response in all four extremities  6.  Assess for sensation (numbness and tingling)  7.  Assess basic neuro  reflexes (cough, gag, corneal)  8.  Identify changes in neuro status and report to provider for testing/treatment orders  Outcome: Progressing     Problem: Knowledge Deficit - Standard  Goal: Patient and family/care givers will demonstrate understanding of plan of care, disease process/condition, diagnostic tests and medications  Description: Target End Date:  1-3 days or as soon as patient condition allows    Document in Patient Education    1.  Patient and family/caregiver oriented to unit, equipment, visitation policy and means for communicating concern  2.  Complete/review Learning Assessment  3.  Assess knowledge level of disease process/condition, treatment plan, diagnostic tests and medications  4.  Explain disease process/condition, treatment plan, diagnostic tests and medications  Outcome: Progressing

## 2023-08-16 NOTE — DISCHARGE SUMMARY
Discharge Summary    CHIEF COMPLAINT ON ADMISSION  Chief Complaint   Patient presents with    Dizziness     Sudden onset upon awakening at 0100 today  Feels like the whole room is spinning  Worsens when bends over    Headache     Sudden onset upon awakening at 0100 today  Denies recent falls/trauma  Denies visual changes/N/V  Pain 4/10       Reason for Admission  DIZZINESS; HEADACHE     Admission Date  8/14/2023    CODE STATUS  FULL    HPI & HOSPITAL COURSE  Angeles Mcpherson is a 63 y.o. female w no PMH ith who presented 8/14/2023 with evaluation for dizziness.  Patient reported having sudden onset of dizziness, while standing up, feel as though the room is spinning around her.  She reported unsteady gait, difficulty ambulating due to persistent dizziness.  Therefore, came to ER for evaluation.  No acute findings noted on CTA head and neck.  Her SBP above 160 in ER.  Admitted to medicine service for observation.    Hospital course under my care 8/15: afebrile and vitals wnl; sinus otf but with appropriate chronotrophic response. Exam at bedside was grossly unremarkable; left knee exam was unremarkable. Labs and imagings findings discussed with patient. The way she described her symptoms are likely of a peripheral cause rather than a central cause such as TIA/CVA. Vasovagal syncope and a possible vestibular etiology discussed. A trial of meclizine discussed.Echo reviewed: normal LVEF, no wall motion abnormalities and no significant valvular diseases. Elevated LDL but a reasonable choice at this time is a continued life style modification and exercise before starting a statin. Pt ambulated well in the unit. At this point, the patient is deemed stable for DC with a close outpatient follow up.     Therefore, she is discharged in fair and stable condition to home with close outpatient follow-up.    The patient recovered much more quickly than anticipated on admission.    Discharge Date  8/15/2023    FOLLOW UP ITEMS POST  DISCHARGE  N/A    DISCHARGE DIAGNOSES  Principal Problem:    Dizziness (POA: Yes)  Active Problems:    Obesity (BMI 30.0-34.9) (POA: Yes)      Overview: Chronic condition with weight today in clinic at 244 pounds and BMI at       35.01.  Patient is actively working with functional medicine for weight       loss and has a list of labs she would like ordered per functional medicine       doctor.  She is working on actively eating much healthier.  Consuming       anywhere from 1200 to 1500 jacob/day    TIA (transient ischemic attack) (POA: Yes)    Hypertension (POA: Yes)    Hyperglycemia (POA: Yes)  Resolved Problems:    * No resolved hospital problems. *      FOLLOW UP  Future Appointments   Date Time Provider Department Center   8/22/2023 10:00 AM Jono L Michael, D.N.P. [facility] [address]     No follow-up provider specified.    MEDICATIONS ON DISCHARGE     Medication List        START taking these medications        Instructions   meclizine 25 MG Tabs  Commonly known as: Antivert   Take 1 Tablet by mouth 3 times a day as needed for Vertigo (dizziness).  Dose: 25 mg     ondansetron 4 MG Tbdp  Commonly known as: Zofran ODT   Dissolve 1 Tablet by mouth every four hours as needed for Nausea/Vomiting  Dose: 4 mg              Allergies  No Known Allergies    DIET  Regular    ACTIVITY  As tolerated.  Weight bearing as tolerated    CONSULTATIONS  N/A    PROCEDURES  N/A    LABORATORY  Lab Results   Component Value Date    SODIUM 141 08/15/2023    POTASSIUM 3.7 08/15/2023    CHLORIDE 107 08/15/2023    CO2 23 08/15/2023    GLUCOSE 116 (H) 08/15/2023    BUN 24 (H) 08/15/2023    CREATININE 0.72 08/15/2023        Lab Results   Component Value Date    WBC 5.8 08/15/2023    HEMOGLOBIN 14.6 08/15/2023    HEMATOCRIT 43.2 08/15/2023    PLATELETCT 219 08/15/2023        Total time of the discharge process exceeds 37 minutes.

## 2023-08-17 ENCOUNTER — OFFICE VISIT (OUTPATIENT)
Dept: MEDICAL GROUP | Facility: PHYSICIAN GROUP | Age: 63
End: 2023-08-17
Payer: COMMERCIAL

## 2023-08-17 VITALS
WEIGHT: 228.6 LBS | HEIGHT: 70 IN | HEART RATE: 64 BPM | SYSTOLIC BLOOD PRESSURE: 90 MMHG | OXYGEN SATURATION: 92 % | DIASTOLIC BLOOD PRESSURE: 60 MMHG | TEMPERATURE: 97.4 F | BODY MASS INDEX: 32.73 KG/M2

## 2023-08-17 DIAGNOSIS — E78.5 DYSLIPIDEMIA: ICD-10-CM

## 2023-08-17 DIAGNOSIS — R42 VERTIGO: ICD-10-CM

## 2023-08-17 DIAGNOSIS — Z09 HOSPITAL DISCHARGE FOLLOW-UP: ICD-10-CM

## 2023-08-17 PROCEDURE — 3074F SYST BP LT 130 MM HG: CPT

## 2023-08-17 PROCEDURE — 99213 OFFICE O/P EST LOW 20 MIN: CPT

## 2023-08-17 PROCEDURE — 3078F DIAST BP <80 MM HG: CPT

## 2023-08-17 ASSESSMENT — ENCOUNTER SYMPTOMS
CHILLS: 0
HEADACHES: 0
NAUSEA: 0
WEAKNESS: 0
CONSTIPATION: 0
DIARRHEA: 0
VOMITING: 0
ABDOMINAL PAIN: 0
WEIGHT LOSS: 0
SHORTNESS OF BREATH: 0
FEVER: 0
MYALGIAS: 0
DIZZINESS: 0
COUGH: 0
BLURRED VISION: 0

## 2023-08-17 ASSESSMENT — FIBROSIS 4 INDEX: FIB4 SCORE: 1.11

## 2023-08-17 NOTE — ASSESSMENT & PLAN NOTE
Transient episode of vertigo with resolution  -Recommend adequate hydration of at least 64 ounces of water per day  -Recommend regularly scheduled well-balanced meals to avoid possible hypoglycemia  -Recommend changing positions slowly to avoid orthostatic hypotension blood pressure is low today at 90/60  Strict ED precautions given and known for worsening or progression of this condition including any loss of consciousness.  Return to clinic should condition return or fail to improve

## 2023-08-17 NOTE — ASSESSMENT & PLAN NOTE
This is a new diagnosis  -Recommend diligent efforts towards healthy lifestyle as discussed:-Exercise: At least 150 minutes of moderate aerobic activity per week or 75 minutes of vigorous aerobic activity per week, +2 days/week of strength training  - Healthy lifestyle and eating habits: Mediterranean-based diet (rich in fruits, vegetables, nuts and healthy oils), proper hydration and avoiding sugary beverages, adequate sleep hygiene-(allowing 7 to 8 hours of overnight sleep).  -Cardiac CT scan ordered

## 2023-08-17 NOTE — PROGRESS NOTES
Subjective:     CC: ER follow-up    HPI:   Angeles presents today with    Problem   Hospital Discharge Follow-Up    Transient episode of vertigo 8/14/23  She evidently got out of bed in the middle of the night and felt tremendous sense of imbalance. She also reports prior to incidence feeling a headache and woozyness. Denies alcohol use prior to incident or dehydration.  -Has had an eye exam within the last 6 months  -ER findings of CT head and echo were unremarkable, recent labs all within normal limits.  Her symptoms have all subsided     Dyslipidemia    Not on a statin for this condition  Lab Results   Component Value Date/Time    CHOLSTRLTOT 186 08/15/2023 01:17 AM    CHOLSTRLTOT 190 07/12/2023 02:21 PM     (H) 08/15/2023 01:17 AM     (H) 07/12/2023 02:21 PM    HDL 35 (A) 08/15/2023 01:17 AM    HDL 40 07/12/2023 02:21 PM    TRIGLYCERIDE 160 (H) 08/15/2023 01:17 AM    TRIGLYCERIDE 126 07/12/2023 02:21 PM            Vertigo    Patient reports vertigo-like symptoms for which she went to the emergency room on 8/14/2022  -Symptoms have since resolved  -CT head and echo showed no evidence of stroke or internal derangements  -Was given meclizine but this was not ultimately helpful as condition has resolved and it just made her tired.  She is not taking this medication any longer.  She was also given Zofran for nausea but has not been taking that either as nausea is not prevalent at this time.         Health Maintenance: Completed    ROS:  Review of Systems   Constitutional:  Negative for chills, fever, malaise/fatigue and weight loss.   Eyes:  Negative for blurred vision.   Respiratory:  Negative for cough and shortness of breath.    Cardiovascular:  Negative for chest pain.   Gastrointestinal:  Negative for abdominal pain, constipation, diarrhea, nausea and vomiting.   Musculoskeletal:  Negative for myalgias.   Neurological:  Negative for dizziness, weakness and headaches.       Objective:     Exam:  BP  "90/60 (BP Location: Left arm, Patient Position: Sitting, BP Cuff Size: Large adult)   Pulse 64   Temp 36.3 °C (97.4 °F) (Temporal)   Ht 1.778 m (5' 10\")   Wt 104 kg (228 lb 9.6 oz)   SpO2 92%   BMI 32.80 kg/m²  Body mass index is 32.8 kg/m².    Physical Exam  Constitutional:       General: She is not in acute distress.     Appearance: Normal appearance. She is not ill-appearing or toxic-appearing.   HENT:      Head: Normocephalic.   Eyes:      Conjunctiva/sclera: Conjunctivae normal.   Cardiovascular:      Rate and Rhythm: Normal rate and regular rhythm.      Pulses: Normal pulses.      Heart sounds: Normal heart sounds. No murmur heard.  Pulmonary:      Effort: Pulmonary effort is normal. No respiratory distress.      Breath sounds: Normal breath sounds.   Skin:     General: Skin is warm and dry.   Neurological:      General: No focal deficit present.      Mental Status: She is alert and oriented to person, place, and time.      Cranial Nerves: Cranial nerves 2-12 are intact. No cranial nerve deficit or facial asymmetry.      Motor: No weakness.      Coordination: Romberg sign negative.      Gait: Gait normal.   Psychiatric:         Mood and Affect: Mood normal.         Behavior: Behavior normal.         Labs:   Admission on 2023, Discharged on 08/15/2023   Component Date Value    Report 2023                      Value:[facility] Emergency Dept.    Test Date:  2023  Pt Name:    ELODIA IZAGUIRRE                Department: ER  MRN:        [id number]                      Room:  Gender:     Female                       Technician: [id number]  :        1960                   Requested By:ER TRIAGE PROTOCOL  Order #:    [id number]                    Reading MD: Jared Marlow    Measurements  Intervals                                Axis  Rate:       54                           P:          46  WA:         163                          QRS:        74  QRSD:       96                     "       T:          57  QT:         408  QTc:        387    Interpretive Statements  Sinus bradycardia  Probable left atrial enlargement  No previous ECG available for comparison  Electronically Signed On 08- 15:23:16 PDT by Jared Marlow      WBC 08/14/2023 4.6 (L)     RBC 08/14/2023 5.22     Hemoglobin 08/14/2023 16.1 (H)     Hematocrit 08/14/2023 45.8     MCV 08/14/2023 87.7     MCH 08/14/2023 30.8     MCHC 08/14/2023 35.2     RDW 08/14/2023 40.3     Platelet Count 08/14/2023 256     MPV 08/14/2023 9.7     Neutrophils-Polys 08/14/2023 54.50     Lymphocytes 08/14/2023 31.80     Monocytes 08/14/2023 10.20     Eosinophils 08/14/2023 2.40     Basophils 08/14/2023 0.90     Immature Granulocytes 08/14/2023 0.20     Nucleated RBC 08/14/2023 0.00     Neutrophils (Absolute) 08/14/2023 2.52     Lymphs (Absolute) 08/14/2023 1.47     Monos (Absolute) 08/14/2023 0.47     Eos (Absolute) 08/14/2023 0.11     Baso (Absolute) 08/14/2023 0.04     Immature Granulocytes (a* 08/14/2023 0.01     NRBC (Absolute) 08/14/2023 0.00     Sodium 08/14/2023 137     Potassium 08/14/2023 4.0     Chloride 08/14/2023 104     Co2 08/14/2023 20     Anion Gap 08/14/2023 13.0     Glucose 08/14/2023 104 (H)     Bun 08/14/2023 20     Creatinine 08/14/2023 0.67     Calcium 08/14/2023 9.9     Correct Calcium 08/14/2023 9.7     AST(SGOT) 08/14/2023 15     ALT(SGPT) 08/14/2023 15     Alkaline Phosphatase 08/14/2023 87     Total Bilirubin 08/14/2023 1.0     Albumin 08/14/2023 4.2     Total Protein 08/14/2023 6.9     Globulin 08/14/2023 2.7     A-G Ratio 08/14/2023 1.6     Troponin T 08/14/2023 <6     Troponin T 08/14/2023 <6     GFR (CKD-EPI) 08/14/2023 98     Glycohemoglobin 08/14/2023 5.6     Est Avg Glucose 08/14/2023 114     Eject.Frac. MOD BP 08/15/2023 52.38     Eject.Frac. MOD 4C 08/15/2023 62.41     Eject.Frac. MOD 2C 08/15/2023 53.77     Left Ventrical Ejection * 08/15/2023 60     Cholesterol,Tot 08/15/2023 186     Triglycerides 08/15/2023 160 (H)      HDL 08/15/2023 35 (A)     LDL 08/15/2023 119 (H)     WBC 08/15/2023 5.8     RBC 08/15/2023 4.81     Hemoglobin 08/15/2023 14.6     Hematocrit 08/15/2023 43.2     MCV 08/15/2023 89.8     MCH 08/15/2023 30.4     MCHC 08/15/2023 33.8     RDW 08/15/2023 41.1     Platelet Count 08/15/2023 219     MPV 08/15/2023 9.9     Neutrophils-Polys 08/15/2023 51.00     Lymphocytes 08/15/2023 36.10     Monocytes 08/15/2023 9.30     Eosinophils 08/15/2023 2.70     Basophils 08/15/2023 0.70     Immature Granulocytes 08/15/2023 0.20     Nucleated RBC 08/15/2023 0.00     Neutrophils (Absolute) 08/15/2023 2.97     Lymphs (Absolute) 08/15/2023 2.10     Monos (Absolute) 08/15/2023 0.54     Eos (Absolute) 08/15/2023 0.16     Baso (Absolute) 08/15/2023 0.04     Immature Granulocytes (a* 08/15/2023 0.01     NRBC (Absolute) 08/15/2023 0.00     Sodium 08/15/2023 141     Potassium 08/15/2023 3.7     Chloride 08/15/2023 107     Co2 08/15/2023 23     Glucose 08/15/2023 116 (H)     Creatinine 08/15/2023 0.72     Bun 08/15/2023 24 (H)     Calcium 08/15/2023 9.8     Correct Calcium 08/15/2023 10.0     Phosphorus 08/15/2023 4.1     Albumin 08/15/2023 3.7     Magnesium 08/15/2023 2.1     GFR (CKD-EPI) 08/15/2023 94          Assessment & Plan: Medical Decision Making     63 y.o. female with the following -     Problem List Items Addressed This Visit       Vertigo     Acute condition-resolved  -ED precautions given and known for worsening or progression of condition including any loss of consciousness as discussed  -Return to clinic should condition recur-plan will be to order MRI and send patient to neurology         Hospital discharge follow-up     Transient episode of vertigo with resolution  -Recommend adequate hydration of at least 64 ounces of water per day  -Recommend regularly scheduled well-balanced meals to avoid possible hypoglycemia  -Recommend changing positions slowly to avoid orthostatic hypotension blood pressure is low today at  90/60  Strict ED precautions given and known for worsening or progression of this condition including any loss of consciousness.  Return to clinic should condition return or fail to improve            Dyslipidemia     This is a new diagnosis  -Recommend diligent efforts towards healthy lifestyle as discussed:-Exercise: At least 150 minutes of moderate aerobic activity per week or 75 minutes of vigorous aerobic activity per week, +2 days/week of strength training  - Healthy lifestyle and eating habits: Mediterranean-based diet (rich in fruits, vegetables, nuts and healthy oils), proper hydration and avoiding sugary beverages, adequate sleep hygiene-(allowing 7 to 8 hours of overnight sleep).  -Cardiac CT scan ordered         Relevant Orders    CT-CARDIAC SCORING    Lipoprotein (a)       Differential diagnosis, natural history, supportive care, and indications for immediate follow-up discussed.  Shared decision making approach utilized, and patient is amendable with plan of care.  Patient understands to return to clinic or go to the emergency department if symptoms worsen. All questions and concerns addressed to the best of my knowledge.    Return in about 3 months (around 11/17/2023).    Please note that this dictation was created using voice recognition software. I have made every reasonable attempt to correct obvious errors, but I expect that there are errors of grammar and possibly content that I did not discover before finalizing the note.

## 2023-08-24 ENCOUNTER — TELEPHONE (OUTPATIENT)
Dept: HEALTH INFORMATION MANAGEMENT | Facility: OTHER | Age: 63
End: 2023-08-24
Payer: COMMERCIAL

## 2023-10-28 ENCOUNTER — HOSPITAL ENCOUNTER (OUTPATIENT)
Dept: RADIOLOGY | Facility: MEDICAL CENTER | Age: 63
End: 2023-10-28
Attending: PHYSICIAN ASSISTANT
Payer: COMMERCIAL

## 2023-10-28 ENCOUNTER — OFFICE VISIT (OUTPATIENT)
Dept: URGENT CARE | Facility: PHYSICIAN GROUP | Age: 63
End: 2023-10-28
Payer: COMMERCIAL

## 2023-10-28 VITALS
RESPIRATION RATE: 16 BRPM | DIASTOLIC BLOOD PRESSURE: 68 MMHG | HEIGHT: 70 IN | SYSTOLIC BLOOD PRESSURE: 116 MMHG | TEMPERATURE: 96.4 F | WEIGHT: 227 LBS | OXYGEN SATURATION: 95 % | HEART RATE: 66 BPM | BODY MASS INDEX: 32.5 KG/M2

## 2023-10-28 DIAGNOSIS — M79.671 FOOT PAIN, RIGHT: ICD-10-CM

## 2023-10-28 PROCEDURE — 3078F DIAST BP <80 MM HG: CPT | Performed by: PHYSICIAN ASSISTANT

## 2023-10-28 PROCEDURE — 3074F SYST BP LT 130 MM HG: CPT | Performed by: PHYSICIAN ASSISTANT

## 2023-10-28 PROCEDURE — 99203 OFFICE O/P NEW LOW 30 MIN: CPT | Performed by: PHYSICIAN ASSISTANT

## 2023-10-28 PROCEDURE — 73630 X-RAY EXAM OF FOOT: CPT | Mod: RT

## 2023-10-28 ASSESSMENT — ENCOUNTER SYMPTOMS
NUMBNESS: 0
FEVER: 0
CHILLS: 0
WEAKNESS: 0
ARTHRALGIAS: 1
JOINT SWELLING: 0
TINGLING: 0
SENSORY CHANGE: 0
FOCAL WEAKNESS: 0

## 2023-10-28 ASSESSMENT — FIBROSIS 4 INDEX: FIB4 SCORE: 1.11

## 2023-10-28 NOTE — PROGRESS NOTES
"Subjective     Angeles Mcpherson is a 63 y.o. female who presents with Foot Problem (Rt foot pain patient states, no injury, painful to walk when barefoot, started yesterday )            Foot Problem  This is a new problem. The current episode started yesterday (acute onset of sharp right foot pain. No injury or strenuous exercise). The problem occurs constantly. The problem has been unchanged. Associated symptoms include arthralgias. Pertinent negatives include no chills, fever, joint swelling, numbness, rash or weakness. Exacerbated by: bearing weight, dorsiflexion. She has tried nothing for the symptoms.     The patient denies previous injury. No alcohol use.       Past Medical History:   Diagnosis Date    Arthritis          Past Surgical History:   Procedure Laterality Date    CHOLECYSTECTOMY      HIP ARTHROPLASTY TOTAL Right        Family History   Problem Relation Age of Onset    Cancer Mother         thyroid    Dementia Mother          Patient has no known allergies.      Medications, Allergies, and current problem list reviewed today in Epic      Review of Systems   Constitutional:  Negative for chills, fever and malaise/fatigue.   Musculoskeletal:  Positive for arthralgias and joint pain (right foot pain). Negative for joint swelling.   Skin:  Negative for rash.   Neurological:  Negative for tingling, sensory change, focal weakness, weakness and numbness.     All other systems reviewed and are negative.            Objective     /68 (BP Location: Left arm, Patient Position: Sitting, BP Cuff Size: Large adult)   Pulse 66   Temp (!) 35.8 °C (96.4 °F)   Resp 16   Ht 1.778 m (5' 10\")   Wt 103 kg (227 lb)   SpO2 95%   BMI 32.57 kg/m²      Physical Exam  Constitutional:       General: She is not in acute distress.     Appearance: She is not ill-appearing.   HENT:      Head: Normocephalic and atraumatic.   Eyes:      Conjunctiva/sclera: Conjunctivae normal.   Cardiovascular:      Rate and Rhythm: Normal " rate and regular rhythm.   Pulmonary:      Effort: Pulmonary effort is normal. No respiratory distress.      Breath sounds: No stridor. No wheezing.   Musculoskeletal:        Feet:    Skin:     General: Skin is warm and dry.      Findings: No erythema.   Neurological:      General: No focal deficit present.      Mental Status: She is alert and oriented to person, place, and time.   Psychiatric:         Mood and Affect: Mood normal.         Behavior: Behavior normal.         Thought Content: Thought content normal.         Judgment: Judgment normal.              10/28/2023 11:38 AM     HISTORY/REASON FOR EXAM:  Atraumatic Pain/Swelling/Deformity  Right foot pain.     TECHNIQUE/EXAM DESCRIPTION AND NUMBER OF VIEWS:  3 views of the RIGHT foot.     COMPARISON:  None.     FINDINGS:     No acute fracture or dislocation.     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        IMPRESSION:        No acute osseous abnormality.             Assessment & Plan        1. Foot pain, right    - DX-FOOT-COMPLETE 3+ RIGHT; Future          X-ray reviewed. Agree with RAD above  RICE  OTC analgesics.  Reassurance given    Differential diagnoses, Supportive care, and indications for immediate follow-up discussed with patient.   Pathogenesis of diagnosis discussed including typical length and natural progression.   Instructed to return to clinic or nearest emergency department for any change in condition, further concerns, or worsening of symptoms.        The patient demonstrated a good understanding and agreed with the treatment plan.      Guera Amaya P.A.-C.

## 2024-04-29 ENCOUNTER — OFFICE VISIT (OUTPATIENT)
Dept: MEDICAL GROUP | Facility: PHYSICIAN GROUP | Age: 64
End: 2024-04-29
Payer: COMMERCIAL

## 2024-04-29 VITALS
BODY MASS INDEX: 34.88 KG/M2 | TEMPERATURE: 97.5 F | RESPIRATION RATE: 16 BRPM | HEART RATE: 64 BPM | WEIGHT: 243.6 LBS | DIASTOLIC BLOOD PRESSURE: 56 MMHG | OXYGEN SATURATION: 95 % | HEIGHT: 70 IN | SYSTOLIC BLOOD PRESSURE: 104 MMHG

## 2024-04-29 DIAGNOSIS — Z51.81 ENCOUNTER FOR MEDICATION MONITORING: ICD-10-CM

## 2024-04-29 DIAGNOSIS — E66.9 OBESITY (BMI 30.0-34.9): Primary | ICD-10-CM

## 2024-04-29 PROBLEM — I10 HYPERTENSION: Status: RESOLVED | Noted: 2023-08-14 | Resolved: 2024-04-29

## 2024-04-29 RX ORDER — PHENTERMINE AND TOPIRAMATE 7.5; 46 MG/1; MG/1
1 CAPSULE, EXTENDED RELEASE ORAL DAILY
Qty: 76 CAPSULE | Refills: 0 | Status: SHIPPED | OUTPATIENT
Start: 2024-05-13 | End: 2024-07-28

## 2024-04-29 RX ORDER — PHENTERMINE AND TOPIRAMATE 3.75; 23 MG/1; MG/1
1 CAPSULE, EXTENDED RELEASE ORAL DAILY
Qty: 14 CAPSULE | Refills: 0 | Status: SHIPPED | OUTPATIENT
Start: 2024-04-29 | End: 2024-05-13

## 2024-04-29 ASSESSMENT — PATIENT HEALTH QUESTIONNAIRE - PHQ9: CLINICAL INTERPRETATION OF PHQ2 SCORE: 0

## 2024-04-29 ASSESSMENT — FIBROSIS 4 INDEX: FIB4 SCORE: 1.13

## 2024-06-20 ENCOUNTER — OFFICE VISIT (OUTPATIENT)
Dept: URGENT CARE | Facility: CLINIC | Age: 64
End: 2024-06-20
Payer: COMMERCIAL

## 2024-06-20 VITALS
RESPIRATION RATE: 14 BRPM | TEMPERATURE: 96 F | HEIGHT: 70 IN | OXYGEN SATURATION: 95 % | SYSTOLIC BLOOD PRESSURE: 128 MMHG | WEIGHT: 228 LBS | BODY MASS INDEX: 32.64 KG/M2 | DIASTOLIC BLOOD PRESSURE: 74 MMHG | HEART RATE: 72 BPM

## 2024-06-20 DIAGNOSIS — J06.9 URI WITH COUGH AND CONGESTION: ICD-10-CM

## 2024-06-20 PROCEDURE — 99213 OFFICE O/P EST LOW 20 MIN: CPT | Performed by: PHYSICIAN ASSISTANT

## 2024-06-20 PROCEDURE — 3074F SYST BP LT 130 MM HG: CPT | Performed by: PHYSICIAN ASSISTANT

## 2024-06-20 PROCEDURE — 3078F DIAST BP <80 MM HG: CPT | Performed by: PHYSICIAN ASSISTANT

## 2024-06-20 RX ORDER — PREDNISONE 20 MG/1
40 TABLET ORAL DAILY
Qty: 10 TABLET | Refills: 0 | Status: SHIPPED | OUTPATIENT
Start: 2024-06-20 | End: 2024-06-25

## 2024-06-20 RX ORDER — ALBUTEROL SULFATE 90 UG/1
2 AEROSOL, METERED RESPIRATORY (INHALATION) EVERY 6 HOURS PRN
Qty: 8.5 G | Refills: 0 | Status: SHIPPED | OUTPATIENT
Start: 2024-06-20

## 2024-06-20 RX ORDER — DEXTROMETHORPHAN HYDROBROMIDE AND PROMETHAZINE HYDROCHLORIDE 15; 6.25 MG/5ML; MG/5ML
5 SYRUP ORAL EVERY 4 HOURS PRN
Qty: 120 ML | Refills: 0 | Status: SHIPPED | OUTPATIENT
Start: 2024-06-20

## 2024-06-20 ASSESSMENT — ENCOUNTER SYMPTOMS
SORE THROAT: 0
MYALGIAS: 0
SHORTNESS OF BREATH: 0
ABDOMINAL PAIN: 0
NAUSEA: 0
COUGH: 1
VOMITING: 0
SINUS PAIN: 0
FEVER: 0
SPUTUM PRODUCTION: 0
WHEEZING: 0
HEADACHES: 0
CHILLS: 0
PALPITATIONS: 0
DIAPHORESIS: 0
DIARRHEA: 0
DIZZINESS: 0

## 2024-06-20 ASSESSMENT — FIBROSIS 4 INDEX: FIB4 SCORE: 1.13

## 2024-06-20 NOTE — PROGRESS NOTES
Subjective:     CHIEF COMPLAINT  Chief Complaint   Patient presents with    Coronavirus Screening     Dry cough , congestion x 4 days , has tested negative for covid 2x this week        HPI  Angeles Mcpherson is a very pleasant 64 y.o. female who presents to the clinic with URI-like symptoms x 4 days.  Symptoms started fairly abruptly with fatigue, chills, cough and congestion.  Chills and headaches have gradually subsided.  She has not been running a fever.  Cough is her main complaint at this time.  States with talking and deep breathing she goes into a spastic coughing fit.  Occasionally produces small amounts of clear sputum.  No shortness of breath or chest pain.  Has been taking TheraFlu without any significant relief.  Took 2 at home COVID test both returning negative.    REVIEW OF SYSTEMS  Review of Systems   Constitutional:  Positive for malaise/fatigue. Negative for chills, diaphoresis and fever.   HENT:  Positive for congestion. Negative for ear pain, sinus pain and sore throat.    Respiratory:  Positive for cough. Negative for sputum production, shortness of breath and wheezing.    Cardiovascular:  Negative for chest pain and palpitations.   Gastrointestinal:  Negative for abdominal pain, diarrhea, nausea and vomiting.   Musculoskeletal:  Negative for myalgias.   Neurological:  Negative for dizziness and headaches.       PAST MEDICAL HISTORY  Patient Active Problem List    Diagnosis Date Noted    Hospital discharge follow-up 08/17/2023    Dyslipidemia 08/17/2023    Vertigo 08/14/2023    TIA (transient ischemic attack) 08/14/2023    Hyperglycemia 08/14/2023    Obesity (BMI 30.0-34.9) 05/16/2023    Status post right hip replacement 05/16/2023    Osteoarthritis of right hip 09/13/2017    Subclinical hypothyroidism 05/04/2014       SURGICAL HISTORY   has a past surgical history that includes hip arthroplasty total (Right) and cholecystectomy.    ALLERGIES  No Known Allergies    CURRENT MEDICATIONS  Home  "Medications       Reviewed by Dennis Sexton P.A.-C. (Physician Assistant) on 06/20/24 at 0821  Med List Status: <None>     Medication Last Dose Status   Phentermine-Topiramate (QSYMIA) 7.5-46 MG CAPSULE SR 24 HR Taking Active                    SOCIAL HISTORY  Social History     Tobacco Use    Smoking status: Never    Smokeless tobacco: Never   Vaping Use    Vaping status: Never Used   Substance and Sexual Activity    Alcohol use: Yes     Comment: rare    Drug use: Never    Sexual activity: Not on file       FAMILY HISTORY  Family History   Problem Relation Age of Onset    Cancer Mother         thyroid    Dementia Mother           Objective:     VITAL SIGNS: /74 (BP Location: Left arm, Patient Position: Sitting, BP Cuff Size: Adult)   Pulse 72   Temp (!) 35.6 °C (96 °F) (Temporal)   Resp 14   Ht 1.778 m (5' 10\")   Wt 103 kg (228 lb)   SpO2 95%   BMI 32.71 kg/m²     PHYSICAL EXAM  Physical Exam  Constitutional:       General: She is not in acute distress.     Appearance: Normal appearance. She is not ill-appearing, toxic-appearing or diaphoretic.   HENT:      Head: Normocephalic and atraumatic.      Right Ear: Tympanic membrane, ear canal and external ear normal.      Left Ear: Tympanic membrane, ear canal and external ear normal.      Nose: Congestion and rhinorrhea present.      Mouth/Throat:      Mouth: Mucous membranes are moist.      Pharynx: No oropharyngeal exudate or posterior oropharyngeal erythema.   Eyes:      Conjunctiva/sclera: Conjunctivae normal.   Cardiovascular:      Rate and Rhythm: Normal rate and regular rhythm.      Pulses: Normal pulses.      Heart sounds: Normal heart sounds.   Pulmonary:      Effort: Pulmonary effort is normal.      Breath sounds: Normal breath sounds. No wheezing, rhonchi or rales.      Comments: Dry reactive cough throughout exam  Musculoskeletal:      Cervical back: Normal range of motion. No muscular tenderness.   Lymphadenopathy:      Cervical: No cervical " adenopathy.   Skin:     General: Skin is warm and dry.      Capillary Refill: Capillary refill takes less than 2 seconds.   Neurological:      Mental Status: She is alert.   Psychiatric:         Mood and Affect: Mood normal.         Thought Content: Thought content normal.         Assessment/Plan:     1. URI with cough and congestion  - promethazine-dextromethorphan (PROMETHAZINE-DM) 6.25-15 MG/5ML syrup; Take 5 mL by mouth every four hours as needed for Cough.  Dispense: 120 mL; Refill: 0  - albuterol 108 (90 Base) MCG/ACT Aero Soln inhalation aerosol; Inhale 2 Puffs every 6 hours as needed for Shortness of Breath.  Dispense: 8.5 g; Refill: 0  - predniSONE (DELTASONE) 20 MG Tab; Take 2 Tablets by mouth every day for 5 days.  Dispense: 10 Tablet; Refill: 0      MDM/Comments:    -Discussed viral etiology of URI.     Symptomatic care.  -Oral hydration and rest.   -Over the counter expectorant as directed; Guaifenesin (Mucinex).  -Diphenhydramine as directed for rhinorrhea (runny nose) and sneezing.  --May take over the counter pseudoephedrine for nasal congestion. Can increase your blood pressure.  -Tylenol or ibuprofen for pain and fever as directed.   -Saline nasal spray as a decongestant.  -Contingent oral steroid provided.    Follow up for shortness of breath, fevers, elevated heart rate, weakness, prolonged cough, chest pain, or any other concerns.      Differential diagnosis, natural history, supportive care, and indications for immediate follow-up discussed. All questions answered. Patient agrees with the plan of care.    Follow-up as needed if symptoms worsen or fail to improve to PCP, Urgent care or Emergency Room.    I have personally reviewed prior external notes and test results pertinent to today's visit.  I have independently reviewed and interpreted all diagnostics ordered during this urgent care acute visit.   Discussed management options (risks,benefits, and alternatives to treatment). Pt expresses  understanding and the treatment plan was agreed upon. Questions were encouraged and answered to pt's satisfaction.    Please note that this dictation was created using voice recognition software. I have made a reasonable attempt to correct obvious errors, but I expect that there are errors of grammar and possibly content that I did not discover before finalizing the note.

## 2024-07-24 ENCOUNTER — OFFICE VISIT (OUTPATIENT)
Dept: MEDICAL GROUP | Facility: PHYSICIAN GROUP | Age: 64
End: 2024-07-24
Payer: COMMERCIAL

## 2024-07-24 VITALS
HEART RATE: 62 BPM | HEIGHT: 70 IN | WEIGHT: 230 LBS | TEMPERATURE: 97.1 F | DIASTOLIC BLOOD PRESSURE: 62 MMHG | OXYGEN SATURATION: 97 % | BODY MASS INDEX: 32.93 KG/M2 | SYSTOLIC BLOOD PRESSURE: 122 MMHG

## 2024-07-24 DIAGNOSIS — E66.9 OBESITY (BMI 30.0-34.9): ICD-10-CM

## 2024-07-24 PROCEDURE — 3074F SYST BP LT 130 MM HG: CPT

## 2024-07-24 PROCEDURE — 99214 OFFICE O/P EST MOD 30 MIN: CPT

## 2024-07-24 PROCEDURE — 3078F DIAST BP <80 MM HG: CPT

## 2024-07-24 RX ORDER — PHENTERMINE HYDROCHLORIDE 15 MG/1
15 CAPSULE ORAL EVERY MORNING
Qty: 90 CAPSULE | Refills: 0 | Status: SHIPPED | OUTPATIENT
Start: 2024-07-24 | End: 2024-10-22

## 2024-07-24 RX ORDER — TOPIRAMATE 50 MG/1
50 TABLET, FILM COATED ORAL DAILY
Qty: 90 TABLET | Refills: 2 | Status: SHIPPED | OUTPATIENT
Start: 2024-07-24

## 2024-07-24 ASSESSMENT — ENCOUNTER SYMPTOMS
WEAKNESS: 0
COUGH: 0
WEIGHT LOSS: 0
ABDOMINAL PAIN: 0
MYALGIAS: 0
DIZZINESS: 0
HEADACHES: 0
CHILLS: 0
NAUSEA: 0
DIARRHEA: 0
BLURRED VISION: 0
VOMITING: 0
SHORTNESS OF BREATH: 0
CONSTIPATION: 0
FEVER: 0

## 2024-07-24 ASSESSMENT — FIBROSIS 4 INDEX: FIB4 SCORE: 1.13

## 2024-08-27 DIAGNOSIS — M25.362 INSTABILITY OF LEFT KNEE JOINT: ICD-10-CM

## 2024-08-27 DIAGNOSIS — M25.562 CHRONIC PAIN OF LEFT KNEE: ICD-10-CM

## 2024-08-27 DIAGNOSIS — G89.29 CHRONIC PAIN OF LEFT KNEE: ICD-10-CM

## 2024-09-17 ENCOUNTER — APPOINTMENT (OUTPATIENT)
Dept: RADIOLOGY | Facility: MEDICAL CENTER | Age: 64
End: 2024-09-17
Payer: COMMERCIAL

## 2024-09-17 DIAGNOSIS — M25.562 CHRONIC PAIN OF LEFT KNEE: ICD-10-CM

## 2024-09-17 DIAGNOSIS — G89.29 CHRONIC PAIN OF LEFT KNEE: ICD-10-CM

## 2024-09-17 DIAGNOSIS — S83.242A TEAR OF MEDIAL MENISCUS OF LEFT KNEE, CURRENT, UNSPECIFIED TEAR TYPE, INITIAL ENCOUNTER: ICD-10-CM

## 2024-09-17 DIAGNOSIS — M17.12 OSTEOARTHRITIS OF LEFT KNEE, UNSPECIFIED OSTEOARTHRITIS TYPE: ICD-10-CM

## 2024-09-17 DIAGNOSIS — M25.362 INSTABILITY OF LEFT KNEE JOINT: ICD-10-CM

## 2024-09-17 PROCEDURE — 73721 MRI JNT OF LWR EXTRE W/O DYE: CPT | Mod: LT

## 2024-10-17 ENCOUNTER — OFFICE VISIT (OUTPATIENT)
Dept: SURGICAL ONCOLOGY | Facility: MEDICAL CENTER | Age: 64
End: 2024-10-17
Payer: COMMERCIAL

## 2024-10-17 VITALS
OXYGEN SATURATION: 95 % | SYSTOLIC BLOOD PRESSURE: 104 MMHG | HEART RATE: 60 BPM | BODY MASS INDEX: 32.93 KG/M2 | WEIGHT: 230 LBS | DIASTOLIC BLOOD PRESSURE: 62 MMHG | HEIGHT: 70 IN | TEMPERATURE: 96.8 F

## 2024-10-17 DIAGNOSIS — M17.12 PRIMARY OSTEOARTHRITIS OF LEFT KNEE: ICD-10-CM

## 2024-10-17 PROCEDURE — 3078F DIAST BP <80 MM HG: CPT | Performed by: ORTHOPAEDIC SURGERY

## 2024-10-17 PROCEDURE — 3074F SYST BP LT 130 MM HG: CPT | Performed by: ORTHOPAEDIC SURGERY

## 2024-10-17 PROCEDURE — 99204 OFFICE O/P NEW MOD 45 MIN: CPT | Performed by: ORTHOPAEDIC SURGERY

## 2024-10-17 ASSESSMENT — FIBROSIS 4 INDEX: FIB4 SCORE: 1.13

## 2024-10-21 ASSESSMENT — ENCOUNTER SYMPTOMS
FEVER: 0
SHORTNESS OF BREATH: 0
MYALGIAS: 0
CHILLS: 0
SENSORY CHANGE: 0
WEAKNESS: 0

## 2024-12-04 ENCOUNTER — OFFICE VISIT (OUTPATIENT)
Dept: MEDICAL GROUP | Facility: PHYSICIAN GROUP | Age: 64
End: 2024-12-04
Payer: COMMERCIAL

## 2024-12-04 VITALS
DIASTOLIC BLOOD PRESSURE: 60 MMHG | TEMPERATURE: 96.8 F | HEART RATE: 79 BPM | BODY MASS INDEX: 32.87 KG/M2 | SYSTOLIC BLOOD PRESSURE: 90 MMHG | OXYGEN SATURATION: 97 % | HEIGHT: 70 IN | WEIGHT: 229.6 LBS

## 2024-12-04 DIAGNOSIS — E78.5 DYSLIPIDEMIA: ICD-10-CM

## 2024-12-04 DIAGNOSIS — Z00.00 HEALTHCARE MAINTENANCE: ICD-10-CM

## 2024-12-04 DIAGNOSIS — R73.01 IFG (IMPAIRED FASTING GLUCOSE): ICD-10-CM

## 2024-12-04 DIAGNOSIS — E66.811 OBESITY (BMI 30.0-34.9): ICD-10-CM

## 2024-12-04 PROCEDURE — 3074F SYST BP LT 130 MM HG: CPT

## 2024-12-04 PROCEDURE — 99214 OFFICE O/P EST MOD 30 MIN: CPT

## 2024-12-04 PROCEDURE — 3078F DIAST BP <80 MM HG: CPT

## 2024-12-04 RX ORDER — PHENTERMINE HYDROCHLORIDE 15 MG/1
30 CAPSULE ORAL EVERY MORNING
Qty: 180 CAPSULE | Refills: 0 | Status: SHIPPED | OUTPATIENT
Start: 2024-12-04 | End: 2025-03-04

## 2024-12-04 ASSESSMENT — ENCOUNTER SYMPTOMS
CHILLS: 0
WEIGHT LOSS: 0
ABDOMINAL PAIN: 0
DIARRHEA: 0
CONSTIPATION: 0
MYALGIAS: 0
DIZZINESS: 0
BLURRED VISION: 0
FEVER: 0
SHORTNESS OF BREATH: 0
HEADACHES: 0
WEAKNESS: 0
VOMITING: 0
NAUSEA: 0
COUGH: 0

## 2024-12-04 ASSESSMENT — FIBROSIS 4 INDEX: FIB4 SCORE: 1.13

## 2024-12-05 NOTE — PROGRESS NOTES
"Verbal consent was acquired by the patient to use [employer] ambient listening note generation during this visit  Subjective:     CC: Weight    HPI:   Angeles presents today with  History of Present Illness  The patient presents for evaluation of weight management.    She has been monitoring her weight daily, which initially was 243 pounds. She has been on Topamax and phentermine but discontinued both about a week ago due to running out of one and deciding to stop the other. Since stopping these medications, she has noticed a decrease in her weight. She reports experiencing dry mouth, which she manages by increasing her water intake. Due to kitchen renovations over the past month, she has been consuming prepackaged food. Her diet includes ground beef mixed with spaghetti squash. She has set up a home gym with a Peloton and weight equipment and has been using the Peloton for exercise. However, she has noticed a lack of strength when moving and carrying furniture.    She has a lot of arthritis and is considering total knee replacements for both knees, with the left knee being worse. She was informed that cortisone injections would be effective for a while. She is not currently experiencing any pain.    SOCIAL HISTORY  She works in computers.      No problems updated.      ROS:  Review of Systems   Constitutional:  Negative for chills, fever, malaise/fatigue and weight loss.   Eyes:  Negative for blurred vision.   Respiratory:  Negative for cough and shortness of breath.    Cardiovascular:  Negative for chest pain.   Gastrointestinal:  Negative for abdominal pain, constipation, diarrhea, nausea and vomiting.   Musculoskeletal:  Positive for joint pain. Negative for myalgias.   Neurological:  Negative for dizziness, weakness and headaches.       Objective:     Exam:  BP 90/60 (BP Location: Left arm, Patient Position: Sitting, BP Cuff Size: Adult)   Pulse 79   Temp 36 °C (96.8 °F) (Temporal)   Ht 1.778 m (5' 10\")   Wt " 104 kg (229 lb 9.6 oz)   SpO2 97%   BMI 32.94 kg/m²  Body mass index is 32.94 kg/m².    Physical Exam  Constitutional:       General: She is not in acute distress.     Appearance: Normal appearance. She is not ill-appearing or toxic-appearing.   HENT:      Head: Normocephalic.   Eyes:      Conjunctiva/sclera: Conjunctivae normal.   Pulmonary:      Effort: Pulmonary effort is normal.   Skin:     General: Skin is warm and dry.             Comments: Lipomatious mass 2 cm, mobile, fluctuant, non-tender   Neurological:      General: No focal deficit present.      Mental Status: She is alert and oriented to person, place, and time.   Psychiatric:         Mood and Affect: Mood normal.         Behavior: Behavior normal.         Labs:     Assessment & Plan: Medical Decision Making     64 y.o. female with the following -   Assessment & Plan  1. Weight management.  She has been prescribed phentermine 15 mg, with the option to increase to 30 mg if needed. She is advised to reduce her daily caloric intake by 500 calories, aiming for a range of 1400 to 1600 calories per day. A daily protein intake of at least 150 g is recommended. She is encouraged to use the Chronometer dorene to track her food intake and ensure she meets her protein and calorie goals. She is also advised to incorporate physical activities such as using her Peloton bike, weights, and walking pad to increase her daily steps.    2. Dry mouth.  She reports experiencing dry mouth, which is a side effect of her medication. She is advised to drink more water and consider using gum or lozenges to alleviate the symptoms.    3. Lipoma.  She has a discrete, non-painful lipoma. She is advised to monitor it for any changes. If it starts to change, an ultrasound will be considered to confirm the diagnosis.    4. Knee pain.  She has been experiencing knee pain, particularly in her left knee, which is worse than the right. She is advised to consider cortisone injections if the  pain worsens. She is also encouraged to engage in strength training exercises to improve her muscle strength and support her knees.    5. Health Maintenance.  Non-fasting labs were ordered to assess cholesterol, metabolic panel, and A1c levels. These labs are to ensure she is not prediabetic and to check her liver and kidney function.      Problem List Items Addressed This Visit       Obesity (BMI 30.0-34.9)    Relevant Medications    phentermine 15 MG capsule    Other Relevant Orders    Comp Metabolic Panel    Lipid Profile    HEMOGLOBIN A1C    Dyslipidemia    Relevant Orders    Comp Metabolic Panel    Lipid Profile     Other Visit Diagnoses       Healthcare maintenance        Relevant Orders    Comp Metabolic Panel    Lipid Profile    HEMOGLOBIN A1C    IFG (impaired fasting glucose)        Relevant Orders    HEMOGLOBIN A1C            Differential diagnosis, natural history, supportive care, and indications for immediate follow-up discussed.  Shared decision making approach utilized, and patient is amendable with plan of care.  Patient understands to return to clinic or go to the emergency department if symptoms worsen. All questions and concerns addressed to the best of my knowledge.    Return in about 3 months (around 3/4/2025), or if symptoms worsen or fail to improve.    Please note that this dictation was created using voice recognition software. I have made every reasonable attempt to correct obvious errors, but I expect that there are errors of grammar and possibly content that I did not discover before finalizing the note.

## 2025-01-10 ENCOUNTER — OFFICE VISIT (OUTPATIENT)
Dept: URGENT CARE | Facility: CLINIC | Age: 65
End: 2025-01-10
Payer: COMMERCIAL

## 2025-01-10 VITALS
SYSTOLIC BLOOD PRESSURE: 118 MMHG | RESPIRATION RATE: 16 BRPM | TEMPERATURE: 97.7 F | HEART RATE: 71 BPM | DIASTOLIC BLOOD PRESSURE: 74 MMHG | OXYGEN SATURATION: 95 % | WEIGHT: 220 LBS | HEIGHT: 68 IN | BODY MASS INDEX: 33.34 KG/M2

## 2025-01-10 DIAGNOSIS — R09.81 NASAL CONGESTION: ICD-10-CM

## 2025-01-10 DIAGNOSIS — J06.9 VIRAL UPPER RESPIRATORY TRACT INFECTION: ICD-10-CM

## 2025-01-10 LAB
FLUAV RNA SPEC QL NAA+PROBE: NEGATIVE
FLUBV RNA SPEC QL NAA+PROBE: NEGATIVE
RSV RNA SPEC QL NAA+PROBE: NEGATIVE
SARS-COV-2 RNA RESP QL NAA+PROBE: NEGATIVE

## 2025-01-10 PROCEDURE — 3078F DIAST BP <80 MM HG: CPT

## 2025-01-10 PROCEDURE — 3074F SYST BP LT 130 MM HG: CPT

## 2025-01-10 PROCEDURE — 0241U POCT CEPHEID COV-2, FLU A/B, RSV - PCR: CPT

## 2025-01-10 PROCEDURE — 99213 OFFICE O/P EST LOW 20 MIN: CPT

## 2025-01-10 RX ORDER — ONDANSETRON 4 MG/1
TABLET, ORALLY DISINTEGRATING ORAL
COMMUNITY
Start: 2024-12-08

## 2025-01-10 RX ORDER — FLUTICASONE PROPIONATE 50 MCG
1 SPRAY, SUSPENSION (ML) NASAL DAILY
Qty: 16 G | Refills: 0 | Status: SHIPPED | OUTPATIENT
Start: 2025-01-10

## 2025-01-10 ASSESSMENT — ENCOUNTER SYMPTOMS
WHEEZING: 0
SHORTNESS OF BREATH: 0
DIZZINESS: 0
ABDOMINAL PAIN: 0
PALPITATIONS: 0
VOMITING: 0
NAUSEA: 0

## 2025-01-10 ASSESSMENT — FIBROSIS 4 INDEX: FIB4 SCORE: 1.13

## 2025-01-10 NOTE — PROGRESS NOTES
"Subjective:   Angeles Mcpherson is a 64 y.o. female who presents for Cough (Cough, fatigue, runny nose, sinus pressure x 3 days)    HPI: This is a very pleasant patient presenting with the following -- Onset Tuesday night began with a cough, dry non-productive. She also reports sinus congestion, pressure, post nasal drip. Subjective fevers. She self-tested COVID at home twice, which were both negative. Denies hx of asthma, pna, COPD. Non-smoker.     Review of Systems   HENT:  Positive for congestion. Negative for ear pain.    Respiratory:  Negative for shortness of breath and wheezing.    Cardiovascular:  Negative for chest pain and palpitations.   Gastrointestinal:  Negative for abdominal pain, nausea and vomiting.   Genitourinary:  Negative for dysuria, frequency and urgency.   Skin:  Negative for rash.   Neurological:  Negative for dizziness.   All other systems reviewed and are negative.      Medications:    ondansetron Tbdp  phentermine  topiramate    Allergies: Patient has no known allergies.    Problem List: Angeles Mcpherson does not have any pertinent problems on file.    Surgical History:  Past Surgical History:   Procedure Laterality Date    CHOLECYSTECTOMY      HIP ARTHROPLASTY TOTAL Right        Past Social Hx: Angeles Mcpherson  reports that she has never smoked. She has never used smokeless tobacco. She reports that she does not currently use alcohol. She reports that she does not use drugs.     Past Family Hx:  Angeles Mcpherson family history includes Cancer in her mother; Dementia in her mother.     Problem list, medications, and allergies reviewed by myself today in Epic.     Objective:     /74 (BP Location: Left arm, Patient Position: Sitting, BP Cuff Size: Adult)   Pulse 71   Temp 36.5 °C (97.7 °F) (Temporal)   Resp 16   Ht 1.727 m (5' 8\")   Wt 99.8 kg (220 lb)   SpO2 95%   BMI 33.45 kg/m²     Physical Exam  Vitals reviewed.   Constitutional:       Appearance: Normal appearance.   HENT:      Head: " Normocephalic and atraumatic.      Right Ear: Tympanic membrane, ear canal and external ear normal.      Left Ear: Tympanic membrane, ear canal and external ear normal.      Nose: Congestion and rhinorrhea present.      Mouth/Throat:      Mouth: Mucous membranes are moist.   Eyes:      Conjunctiva/sclera: Conjunctivae normal.   Cardiovascular:      Rate and Rhythm: Normal rate.      Heart sounds: Normal heart sounds.   Pulmonary:      Effort: Pulmonary effort is normal. No respiratory distress.      Breath sounds: Normal breath sounds. No wheezing or rhonchi.   Abdominal:      General: Abdomen is flat. Bowel sounds are normal.      Palpations: Abdomen is soft.      Tenderness: There is no abdominal tenderness. There is no guarding or rebound.   Skin:     General: Skin is warm and dry.      Capillary Refill: Capillary refill takes less than 2 seconds.   Neurological:      Mental Status: She is alert and oriented to person, place, and time.       Assessment/Plan:     Diagnosis and associated orders:     1. Viral upper respiratory tract infection  POCT CEPHEID COV-2, FLU A/B, RSV - PCR      2. Nasal congestion  fluticasone (FLONASE) 50 MCG/ACT nasal spray         Comments/MDM:     POCT flu, covid, rsv negative. Suspect viral etiology. Recommend observation to see if symptoms will resolve spontaneously. Advised supportive care and to treat symptoms with medications as needed. Encouraged rest and adequate fluid intake. May use OTC cetirizine, flonase and saline irrigation for nasal congestion. Discussed typical length and natural progression. Instructed to return to clinic or nearest emergency department for any change in condition, further concerns, or worsening symptoms.         Differential diagnosis, natural history, supportive care, and indications for immediate follow-up discussed. Advised the patient to follow-up with PCP for recheck, reevaluation, and consideration of further management.    Cathie Soto DNP,  APRN, FNP-BC

## 2025-01-13 ENCOUNTER — OFFICE VISIT (OUTPATIENT)
Dept: URGENT CARE | Facility: CLINIC | Age: 65
End: 2025-01-13
Payer: COMMERCIAL

## 2025-01-13 VITALS
OXYGEN SATURATION: 97 % | DIASTOLIC BLOOD PRESSURE: 68 MMHG | SYSTOLIC BLOOD PRESSURE: 108 MMHG | WEIGHT: 220 LBS | BODY MASS INDEX: 33.34 KG/M2 | RESPIRATION RATE: 16 BRPM | TEMPERATURE: 97.1 F | HEIGHT: 68 IN | HEART RATE: 71 BPM

## 2025-01-13 DIAGNOSIS — R09.81 SINUS CONGESTION: ICD-10-CM

## 2025-01-13 DIAGNOSIS — J98.01 BRONCHOSPASM: ICD-10-CM

## 2025-01-13 DIAGNOSIS — J06.9 VIRAL URI WITH COUGH: ICD-10-CM

## 2025-01-13 PROCEDURE — 99213 OFFICE O/P EST LOW 20 MIN: CPT | Performed by: PHYSICIAN ASSISTANT

## 2025-01-13 PROCEDURE — 3078F DIAST BP <80 MM HG: CPT | Performed by: PHYSICIAN ASSISTANT

## 2025-01-13 PROCEDURE — 3074F SYST BP LT 130 MM HG: CPT | Performed by: PHYSICIAN ASSISTANT

## 2025-01-13 RX ORDER — METHYLPREDNISOLONE 4 MG/1
TABLET ORAL
Qty: 21 TABLET | Refills: 0 | Status: SHIPPED | OUTPATIENT
Start: 2025-01-13

## 2025-01-13 RX ORDER — DEXTROMETHORPHAN HYDROBROMIDE AND PROMETHAZINE HYDROCHLORIDE 15; 6.25 MG/5ML; MG/5ML
5 SYRUP ORAL EVERY 4 HOURS PRN
Qty: 120 ML | Refills: 0 | Status: SHIPPED | OUTPATIENT
Start: 2025-01-13

## 2025-01-13 RX ORDER — ALBUTEROL SULFATE 90 UG/1
2 INHALANT RESPIRATORY (INHALATION) EVERY 6 HOURS PRN
Qty: 8.5 G | Refills: 2 | Status: SHIPPED | OUTPATIENT
Start: 2025-01-13

## 2025-01-13 ASSESSMENT — ENCOUNTER SYMPTOMS
ABDOMINAL PAIN: 0
WHEEZING: 0
SINUS PAIN: 0
DIARRHEA: 0
COUGH: 1
VOMITING: 0
CHILLS: 0
SHORTNESS OF BREATH: 0
SORE THROAT: 0
FEVER: 0
SPUTUM PRODUCTION: 0
NAUSEA: 0

## 2025-01-13 ASSESSMENT — FIBROSIS 4 INDEX: FIB4 SCORE: 1.13

## 2025-01-13 NOTE — PROGRESS NOTES
"Subjective:   Angeles Mcpherson  is a 64 y.o. female who presents for Cough (Follow up, dry mucus, nonproductive cough, eye drainage, nasal drainage)      Cough  This is a new problem. The current episode started in the past 7 days. Pertinent negatives include no chills, ear pain, fever, sore throat, shortness of breath or wheezing.   Patient presents urgent care describing 6 days of symptoms of sinus congestion and coughing.  Was evaluated few days ago and tested negative for COVID RSV and influenza.  She notes continued cough that is become more spastic over interim with near posttussive emesis and difficult to stop coughing spells.  Denies a history of asthma but has done well with inhaler with her respiratory illness last year.  Has been trying Flonase and Zyrtec-D.  Notes significant mouth drying effect with Zyrtec-D.  Denies vomiting abdominal pain or diarrhea.    Review of Systems   Constitutional:  Positive for malaise/fatigue. Negative for chills and fever.   HENT:  Positive for congestion. Negative for ear pain, sinus pain and sore throat.    Respiratory:  Positive for cough (spastic, difficult to stop). Negative for sputum production, shortness of breath and wheezing.    Gastrointestinal:  Negative for abdominal pain, diarrhea, nausea and vomiting.       No Known Allergies     Objective:   /68   Pulse 71   Temp 36.2 °C (97.1 °F) (Temporal)   Resp 16   Ht 1.727 m (5' 8\")   Wt 99.8 kg (220 lb)   SpO2 97%   BMI 33.45 kg/m²     Physical Exam  Vitals and nursing note reviewed.   Constitutional:       General: She is not in acute distress.     Appearance: She is well-developed. She is not diaphoretic.   HENT:      Head: Normocephalic and atraumatic.      Right Ear: Tympanic membrane, ear canal and external ear normal.      Left Ear: Tympanic membrane, ear canal and external ear normal.      Nose:      Right Sinus: Maxillary sinus tenderness present.      Left Sinus: Maxillary sinus tenderness (mild) " present.      Mouth/Throat:      Mouth: Mucous membranes are dry.      Pharynx: Uvula midline. Posterior oropharyngeal erythema ( mild PND) present. No oropharyngeal exudate.      Tonsils: No tonsillar abscesses.   Eyes:      General: Lids are normal. No scleral icterus.        Right eye: No discharge.         Left eye: No discharge.      Conjunctiva/sclera: Conjunctivae normal.   Pulmonary:      Effort: Pulmonary effort is normal. No respiratory distress.      Breath sounds: Normal breath sounds. No stridor. No decreased breath sounds, wheezing, rhonchi or rales.   Musculoskeletal:         General: Normal range of motion.      Cervical back: Neck supple.   Skin:     General: Skin is warm and dry.      Coloration: Skin is not pale.      Findings: No erythema.   Neurological:      Mental Status: She is alert and oriented to person, place, and time. She is not disoriented.   Psychiatric:         Speech: Speech normal.         Behavior: Behavior normal.         Assessment/Plan:   1. Bronchospasm  - methylPREDNISolone (MEDROL DOSEPAK) 4 MG Tablet Therapy Pack; Follow schedule on package instructions.  Dispense: 21 Tablet; Refill: 0    2. Viral URI with cough  - methylPREDNISolone (MEDROL DOSEPAK) 4 MG Tablet Therapy Pack; Follow schedule on package instructions.  Dispense: 21 Tablet; Refill: 0  - promethazine-dextromethorphan (PROMETHAZINE-DM) 6.25-15 MG/5ML syrup; Take 5 mL by mouth every four hours as needed for Cough.  Dispense: 120 mL; Refill: 0  - albuterol 108 (90 Base) MCG/ACT Aero Soln inhalation aerosol; Inhale 2 Puffs every 6 hours as needed for Shortness of Breath.  Dispense: 8.5 g; Refill: 2    3. Sinus congestion  - methylPREDNISolone (MEDROL DOSEPAK) 4 MG Tablet Therapy Pack; Follow schedule on package instructions.  Dispense: 21 Tablet; Refill: 0  - promethazine-dextromethorphan (PROMETHAZINE-DM) 6.25-15 MG/5ML syrup; Take 5 mL by mouth every four hours as needed for Cough.  Dispense: 120 mL; Refill: 0  -  albuterol 108 (90 Base) MCG/ACT Aero Soln inhalation aerosol; Inhale 2 Puffs every 6 hours as needed for Shortness of Breath.  Dispense: 8.5 g; Refill: 2  Supportive care is reviewed with patient/caregiver - recommend to push PO fluids and electrolytes, Cautioned regarding potential side effects of steroid, avoid nsaids while using  Return to clinic with lack of resolution or progression of symptoms.      I have worn an N95 mask, gloves and eye protection for the entire encounter with this patient.     Differential diagnosis, natural history, supportive care, and indications for immediate follow-up discussed.

## 2025-04-22 ENCOUNTER — TELEPHONE (OUTPATIENT)
Dept: FAMILY PLANNING/WOMEN'S HEALTH CLINIC | Facility: PHYSICIAN GROUP | Age: 65
End: 2025-04-22
Payer: COMMERCIAL

## 2025-04-22 NOTE — TELEPHONE ENCOUNTER
Message: Called and left message for patient to schedule annual Comprehensive Health Assessment visit with the [facility]. Left phone number for patient to call [facility] at [phone] to schedule.

## 2025-07-22 ENCOUNTER — HOSPITAL ENCOUNTER (OUTPATIENT)
Dept: RADIOLOGY | Facility: MEDICAL CENTER | Age: 65
End: 2025-07-22
Payer: MEDICARE

## 2025-07-22 DIAGNOSIS — Z12.31 VISIT FOR SCREENING MAMMOGRAM: ICD-10-CM

## 2025-07-22 PROCEDURE — 77063 BREAST TOMOSYNTHESIS BI: CPT
